# Patient Record
Sex: FEMALE | Race: WHITE
[De-identification: names, ages, dates, MRNs, and addresses within clinical notes are randomized per-mention and may not be internally consistent; named-entity substitution may affect disease eponyms.]

---

## 2017-05-22 ENCOUNTER — HOSPITAL ENCOUNTER (INPATIENT)
Dept: HOSPITAL 61 - ER | Age: 57
LOS: 2 days | Discharge: HOME | DRG: 552 | End: 2017-05-24
Attending: INTERNAL MEDICINE | Admitting: INTERNAL MEDICINE
Payer: MEDICARE

## 2017-05-22 VITALS — BODY MASS INDEX: 33.67 KG/M2 | WEIGHT: 227.31 LBS | HEIGHT: 69 IN

## 2017-05-22 DIAGNOSIS — Z88.8: ICD-10-CM

## 2017-05-22 DIAGNOSIS — M51.36: Primary | ICD-10-CM
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
## 2017-05-22 DIAGNOSIS — F32.9: ICD-10-CM

## 2017-05-22 DIAGNOSIS — Z90.710: ICD-10-CM

## 2017-05-22 DIAGNOSIS — Z87.11: ICD-10-CM

## 2017-05-22 DIAGNOSIS — F41.9: ICD-10-CM

## 2017-05-22 DIAGNOSIS — Z82.49: ICD-10-CM

## 2017-05-22 DIAGNOSIS — M54.16: ICD-10-CM

## 2017-05-22 DIAGNOSIS — G62.9: ICD-10-CM

## 2017-05-22 DIAGNOSIS — Z90.49: ICD-10-CM

## 2017-05-22 DIAGNOSIS — Z98.82: ICD-10-CM

## 2017-05-22 DIAGNOSIS — T39.395A: ICD-10-CM

## 2017-05-22 DIAGNOSIS — Z91.041: ICD-10-CM
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
## 2017-05-22 DIAGNOSIS — Z88.6: ICD-10-CM

## 2017-05-22 DIAGNOSIS — E03.9: ICD-10-CM

## 2017-05-22 DIAGNOSIS — M19.90: ICD-10-CM

## 2017-05-22 DIAGNOSIS — E66.9: ICD-10-CM
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
## 2017-05-22 LAB
BACTERIA #/AREA URNS HPF: 0 /HPF
BARBITURATES UR-MCNC: (no result) UG/ML
BENZODIAZ UR-MCNC: (no result) UG/L
BILIRUB UR QL STRIP: NEGATIVE
CANNABINOIDS UR-MCNC: (no result) UG/L
COCAINE UR-MCNC: (no result) NG/ML
GLUCOSE UR STRIP-MCNC: NEGATIVE MG/DL
METHADONE SERPL-MCNC: (no result) NG/ML
NITRITE UR QL STRIP: NEGATIVE
OPIATES UR-MCNC: (no result) NG/ML
PCP SERPL-MCNC: (no result) MG/DL
PH UR STRIP: 6.5 [PH]
PROT UR STRIP-MCNC: NEGATIVE MG/DL
RBC #/AREA URNS HPF: 0 /HPF (ref 0–2)
SP GR UR STRIP: 1.01
SQUAMOUS #/AREA URNS LPF: (no result) /LPF
UROBILINOGEN UR-MCNC: 0.2 MG/DL
WBC #/AREA URNS HPF: (no result) /HPF (ref 0–4)

## 2017-05-22 PROCEDURE — 71010: CPT

## 2017-05-22 PROCEDURE — 80048 BASIC METABOLIC PNL TOTAL CA: CPT

## 2017-05-22 PROCEDURE — 72148 MRI LUMBAR SPINE W/O DYE: CPT

## 2017-05-22 PROCEDURE — 96375 TX/PRO/DX INJ NEW DRUG ADDON: CPT

## 2017-05-22 PROCEDURE — 85027 COMPLETE CBC AUTOMATED: CPT

## 2017-05-22 PROCEDURE — 84439 ASSAY OF FREE THYROXINE: CPT

## 2017-05-22 PROCEDURE — 84484 ASSAY OF TROPONIN QUANT: CPT

## 2017-05-22 PROCEDURE — 84443 ASSAY THYROID STIM HORMONE: CPT

## 2017-05-22 PROCEDURE — 93005 ELECTROCARDIOGRAM TRACING: CPT

## 2017-05-22 PROCEDURE — 83735 ASSAY OF MAGNESIUM: CPT

## 2017-05-22 PROCEDURE — 36415 COLL VENOUS BLD VENIPUNCTURE: CPT

## 2017-05-22 PROCEDURE — 80061 LIPID PANEL: CPT

## 2017-05-22 PROCEDURE — 81001 URINALYSIS AUTO W/SCOPE: CPT

## 2017-05-22 PROCEDURE — 96374 THER/PROPH/DIAG INJ IV PUSH: CPT

## 2017-05-22 NOTE — PHYS DOC
Past Medical History


Past Medical History:  Depression, Other


Additional Past Medical Histor:  Lupus


Past Surgical History:  Appendectomy, Cholecystectomy, , Hysterectomy, 

Other


Additional Past Surgical Histo:  breast implants and removal, rt ankle


Alcohol Use:  Rarely


Drug Use:  None





Adult General


Chief Complaint


Chief Complaint:  CHEST PAIN





HPI


HPI





Patient is a 56  year old female who presents by EMS for multiple reasons.  She 

notes having low back pain and bilateral leg weakness over the past 3 days. She 

has required assistance with ambulation due to leg weakness. She does note 

intermittent shooting/tingling sensations through her pursed area buttock with 

bending and spontaneously. She denies saddle last he had, bowel or bladder 

dysfunction. She states this has made her anxious about her health. She does 

note that in the past week she has been dealing with increased "lupus pain" as 

well. Tonight around 9 PM, she started having chest pressure that radiated 

toward left shoulder associated with dyspnea, that is currently relieved. She 

did take an aspirin today prior to calling EMS. She has never seen a 

cardiologist or had a stress test.  She is not currently on steroids for lupus, 

but has taken the past. She has not seen a rheumatologist for lupus. She denies 

cough, dyspnea, lightheadedness, dizziness, headache, vision changes, abdominal 

pain.





Review of Systems


Review of Systems





Constitutional: Denies fever or chills []


Eyes: Denies change in visual acuity, redness, or eye pain []


HENT: Denies nasal congestion or sore throat []


Respiratory: Denies cough or shortness of breath []


Cardiovascular: No additional information not addressed in HPI []


GI: Denies abdominal pain, nausea, vomiting, bloody stools or diarrhea []


: Denies dysuria or hematuria []


Musculoskeletal: Denies joint pain []


Integument: Denies rash or skin lesions []


Neurologic: Denies headache []


Endocrine: Denies polyuria or polydipsia []





Current Medications


Current Medications





Current Medications








 Medications


  (Trade)  Dose


 Ordered  Sig/Sandy  Start Time


 Stop Time Status Last Admin


Dose Admin


 


 Dexamethasone


 Sodium Phosphate


  (Decadron)  10 mg  1X  ONCE  17 22:55


 17 22:56 DC  


 


 


 Ketorolac


 Tromethamine


  (Toradol)  10 mg  1X  ONCE  17 22:55


 17 22:56 DC  


 











Allergies


Allergies





Allergies








Coded Allergies Type Severity Reaction Last Updated Verified


 


  gabapentin Allergy Unknown  17 Yes


 


  morphine Allergy Unknown Nausea and Vomiting 17 Yes











Physical Exam


Physical Exam





Constitutional: Well developed, well nourished, no acute distress, non-toxic 

appearance. []


HENT: Normocephalic, atraumatic, bilateral external ears normal, oropharynx 

moist,nose normal. []


Eyes: PERRLA, EOMI. [] 


Neck: Normal range of motion, supple. [] 


Cardiovascular:Heart rate regular rhythm []


Lungs & Thorax:  Bilateral breath sounds clear to auscultation. No chest wall 

tenderness []


Abdomen: Bowel sounds normal, soft, no tenderness.  Poor rectal tone on digital 

rectal exam, but sensation intact to light touch. [] 


Skin: Warm, dry, no erythema, no rash. [] 


Back: No tenderness, no CVA tenderness. [] 


Extremities: No tenderness, ROM intact, no edema. [] 


Neurologic: Alert and oriented X 3, normal sensory function, no focal deficits 

noted. Strength 4/5 in bilateral lower extremities []


Psychologic: Affect normal, judgement normal, mood normal. []





Current Patient Data


Vital Signs





 Vital Signs








  Date Time  Temp Pulse Resp B/P (MAP) Pulse Ox O2 Delivery O2 Flow Rate FiO2


 


17 22:19 98.9 87 16 125/65 (85) 95 Room Air  





 98.9       











EKG


EKG


EKG as interpreted by me as normal sinus rhythm, rate 86, no ST-T changes, 

normal intervals, no ectopy





Radiology/Procedures


Radiology/Procedures


Chest xray as interpreted by me with no acute cardiopulmonary disease process





Course & Med Decision Making


Course & Med Decision Making


Pertinent Labs and Imaging studies reviewed. (See chart for details)





HEART score 2.  Recommend admission for ACS rule out due to onset of symptoms 

and no prior evaluation.  Also will be admitted for lower extremity weakness.  

MRI L-spine is ordered to rule out cauda equina syndrome.  Discussed case with 

Dr. Ravi, who will admit.  Cardiology consult placed.





Dragon Disclaimer


Dragon Disclaimer


This electronic medical record was generated, in whole or in part, using a 

voice recognition dictation system.





Departure


Departure


Impression:  


 Primary Impression:  


 Chest pain


 Additional Impressions:  


 Low back pain


 Leg weakness, bilateral


Disposition:   ADMITTED AS INPATIENT


Condition:  STABLE





Problem Qualifiers








 Primary Impression:  


 Chest pain


 Chest pain type:  unspecified  Qualified Codes:  R07.9 - Chest pain, 

unspecified


 Additional Impressions:  


 Low back pain


 Chronicity:  acute  Back pain laterality:  bilateral  Sciatica presence:  with 

sciatica  Sciatica laterality:  bilateral sciatica  Qualified Codes:  M54.42 - 

Lumbago with sciatica, left side; M54.41 - Lumbago with sciatica, right side








JOSH RENDON MD May 22, 2017 23:15

## 2017-05-23 VITALS — SYSTOLIC BLOOD PRESSURE: 109 MMHG | DIASTOLIC BLOOD PRESSURE: 58 MMHG

## 2017-05-23 VITALS — SYSTOLIC BLOOD PRESSURE: 118 MMHG | DIASTOLIC BLOOD PRESSURE: 72 MMHG

## 2017-05-23 VITALS — SYSTOLIC BLOOD PRESSURE: 124 MMHG | DIASTOLIC BLOOD PRESSURE: 73 MMHG

## 2017-05-23 VITALS — SYSTOLIC BLOOD PRESSURE: 119 MMHG | DIASTOLIC BLOOD PRESSURE: 69 MMHG

## 2017-05-23 VITALS — SYSTOLIC BLOOD PRESSURE: 113 MMHG | DIASTOLIC BLOOD PRESSURE: 50 MMHG

## 2017-05-23 VITALS — DIASTOLIC BLOOD PRESSURE: 58 MMHG | SYSTOLIC BLOOD PRESSURE: 106 MMHG

## 2017-05-23 VITALS — DIASTOLIC BLOOD PRESSURE: 61 MMHG | SYSTOLIC BLOOD PRESSURE: 118 MMHG

## 2017-05-23 LAB
ANION GAP SERPL CALC-SCNC: 7 MMOL/L (ref 6–14)
BASOPHILS # BLD AUTO: 0 X10^3/UL (ref 0–0.2)
BASOPHILS NFR BLD: 0 % (ref 0–3)
BUN SERPL-MCNC: 13 MG/DL (ref 7–20)
CALCIUM SERPL-MCNC: 9.2 MG/DL (ref 8.5–10.1)
CHLORIDE SERPL-SCNC: 104 MMOL/L (ref 98–107)
CHOLEST SERPL-MCNC: 190 MG/DL (ref 0–200)
CHOLEST/HDLC SERPL: 3.1 {RATIO}
CO2 SERPL-SCNC: 30 MMOL/L (ref 21–32)
CREAT SERPL-MCNC: 0.8 MG/DL (ref 0.6–1)
EOSINOPHIL NFR BLD: 2 % (ref 0–3)
ERYTHROCYTE [DISTWIDTH] IN BLOOD BY AUTOMATED COUNT: 15.2 % (ref 11.5–14.5)
GFR SERPLBLD BASED ON 1.73 SQ M-ARVRAT: 74.2 ML/MIN
GLUCOSE SERPL-MCNC: 79 MG/DL (ref 70–99)
HCT VFR BLD CALC: 36.6 % (ref 36–47)
HDLC SERPL-MCNC: 62 MG/DL (ref 40–60)
HGB BLD-MCNC: 12.2 G/DL (ref 12–15.5)
LYMPHOCYTES # BLD: 2.1 X10^3/UL (ref 1–4.8)
LYMPHOCYTES NFR BLD AUTO: 40 % (ref 24–48)
MAGNESIUM SERPL-MCNC: 2 MG/DL (ref 1.8–2.4)
MCH RBC QN AUTO: 30 PG (ref 25–35)
MCHC RBC AUTO-ENTMCNC: 33 G/DL (ref 31–37)
MCV RBC AUTO: 90 FL (ref 79–100)
MONOCYTES NFR BLD: 11 % (ref 0–9)
NEUTROPHILS NFR BLD AUTO: 47 % (ref 31–73)
NONHDLC SERPL-MCNC: 128 MG/DL (ref 0–129)
PLATELET # BLD AUTO: 203 X10^3/UL (ref 140–400)
POTASSIUM SERPL-SCNC: 3.9 MMOL/L (ref 3.5–5.1)
RBC # BLD AUTO: 4.05 X10^6/UL (ref 3.5–5.4)
SODIUM SERPL-SCNC: 141 MMOL/L (ref 136–145)
TRIGL SERPL-MCNC: 38 MG/DL (ref 0–150)
WBC # BLD AUTO: 5.2 X10^3/UL (ref 4–11)

## 2017-05-23 RX ADMIN — SULFAMETHOXAZOLE AND TRIMETHOPRIM SCH TAB: 800; 160 TABLET ORAL at 17:07

## 2017-05-23 RX ADMIN — VENLAFAXINE SCH MG: 50 TABLET ORAL at 13:27

## 2017-05-23 RX ADMIN — PANTOPRAZOLE SODIUM SCH MG: 40 TABLET, DELAYED RELEASE ORAL at 12:21

## 2017-05-23 RX ADMIN — ENOXAPARIN SODIUM SCH MG: 40 INJECTION SUBCUTANEOUS at 15:31

## 2017-05-23 RX ADMIN — VENLAFAXINE SCH MG: 50 TABLET ORAL at 21:00

## 2017-05-23 RX ADMIN — FENTANYL CITRATE PRN MCG: 50 INJECTION INTRAMUSCULAR; INTRAVENOUS at 00:16

## 2017-05-23 RX ADMIN — FENTANYL CITRATE PRN MCG: 50 INJECTION INTRAMUSCULAR; INTRAVENOUS at 04:59

## 2017-05-23 RX ADMIN — MORPHINE SULFATE PRN MG: 4 INJECTION, SOLUTION INTRAMUSCULAR; INTRAVENOUS at 13:27

## 2017-05-23 RX ADMIN — MORPHINE SULFATE PRN MG: 4 INJECTION, SOLUTION INTRAMUSCULAR; INTRAVENOUS at 17:54

## 2017-05-23 RX ADMIN — PREGABALIN SCH MG: 50 CAPSULE ORAL at 12:35

## 2017-05-23 RX ADMIN — MORPHINE SULFATE PRN MG: 4 INJECTION, SOLUTION INTRAMUSCULAR; INTRAVENOUS at 22:20

## 2017-05-23 RX ADMIN — LIDOCAINE SCH PATCH: 50 PATCH CUTANEOUS at 12:42

## 2017-05-23 RX ADMIN — OXYCODONE HYDROCHLORIDE AND ACETAMINOPHEN PRN TAB: 5; 325 TABLET ORAL at 20:00

## 2017-05-23 RX ADMIN — BUTORPHANOL TARTRATE PRN MG: 2 INJECTION, SOLUTION INTRAMUSCULAR; INTRAVENOUS at 08:23

## 2017-05-23 RX ADMIN — BUTORPHANOL TARTRATE PRN MG: 2 INJECTION, SOLUTION INTRAMUSCULAR; INTRAVENOUS at 02:07

## 2017-05-23 NOTE — RAD
PROCEDURE 

MRI lumbar spine without contrast 

 

HISTORY 

Lower back pain with bilateral leg weakness and posterior leg numbness 

paresthesias for 3 days 

 

TECHNIQUE 

Multiplanar MRI sequences of the lumbar spine were acquired without 

contrast. 

 

COMPARISON 

MRI lumbar spine December 23, 2012 

 

FINDINGS 

Lumbar vertebral body height and alignment intact. No bone marrow edema. 

Conus medullaris terminates at the L1-L2 disc level cauda equina is 

unremarkable. Paraspinal tissues are unremarkable. Disc disease is 

described below. 

L1-L2: Mild facet hypertrophy. No spinal canal or neural foraminal 

stenosis. 

L2-L3: Mild disc desiccation, left lateral mild broad-based disc 

protrusion. Very mild narrowing of the left neural foramen. Right neural 

foramen patent. No spinal canal stenosis. 

L3-L4: Disc desiccation. Shallow disc bulge mild facet osteophytes spinal 

canal and neural foramina are patent. 

L4-L5: Disc desiccation. Shallow disc bulge with superimposed left lateral

mild broad-based disc protrusion with lateral annulus tear. Mild facet 

osteophytes. Mild -moderate left neural foraminal stenosis is stable. 

Right neural foramen patent. Spinal canal patent. 

L5-S1: Disc desiccation, mild posterior disc height loss, shallow disc 

bulge with areas of annulus tear at the left paracentral and lateral 

annulus and mild lateral endplate osteophytes, there is increased disc 

height loss posteriorly relative to the prior exam. Mild facet 

osteophytes. Very mild neural foraminal narrowing is stable. Spinal canal 

patent. 

 

IMPRESSION 

Lumbar spine disc disease and facet osteoarthritis. No spinal canal 

stenosis. Mild -moderate left neural foraminal stenosis at L4-5. Very mild

narrowing of the neural foramina at L5-S1. See discussion above. 

 

Electronically signed by: Ambrosio Beverly MD (May 23, 2017 01:21:19)

## 2017-05-23 NOTE — PDOC2
CARDIAC CONSULT


DATE OF CONSULT


Date of Consult


DATE: 17 


TIME: 09:15





REASON FOR CONSULT


Reason for Consult:


Chest pain





REFERRING PHYSICIAN


Referring Physician:


White





SOURCE


Source:  Chart review, Patient





HISTORY OF PRESENT ILLNESS


HISTORY OF PRESENT ILLNESS


This is an anxious 57 yo female admitted for complains of lower back pain, leg 

weakness, recent fall, and chest pain.  Reports that she was walking her dog 5 

days ago and upon an incline close to bridge she fell, landed back first.  Her 

back has been bothering her more since then and 2 days ago she noticed more 

tingling to both legs and her weakness intensified yesterday and was not able 

to stand and just to move her legs she needed to assist it with her hands.  

This made her even more anxious and was feeling mid chest tightness with SOA 

which lasted about 10 minutes and has not recurred.  Currently she is 

complaining of low back pain and her weakness remains.  She typically takes 

tramadol and it works for her but this has not been helping much. Denies any CAD

, VTE.  No complains of associated radiating arm or jaw discomfort nor 

palpitations or nausea.





PAST MEDICAL HISTORY


Cardiovascular:  No pertinent hx


Pulmonary:  No pertinent hx


GI:  Peptic Ulcer disease


Heme/Onc:  Other (lupus in remission;  possible lyme disease noted bull eye 

lesion in the past prior to lupus symptoms)


Hepatobiliary:  No pertinent hx


Psych:  Anxiety


Musculoskeletal:  Osteoarthritis


Rheumatologic:  No pertinent hx


Infectious disease:  No pertinent hx


ENT:  No pertinent hx


Renal/:  No pertinent hx


Endocrine:  Hypothyroidism


Dermatology:  No pertinent hx





PAST SURGICAL HISTORY


Past Surgical History:  Appendectomy, Arthroscopy (right ankle), Cholecystectomy

, , Hysterectomy, Other (breast implant and removal)





FAMILY HISTORY


Family History


noncontributory to CV





SOCIAL HISTORY


Smoke:  No


ALCOHOL:  none


Drugs:  None


Lives:  Alone





CURRENT MEDICATIONS


CURRENT MEDICATIONS





Current Medications








 Medications


  (Trade)  Dose


 Ordered  Sig/Sandy


 Route


 PRN Reason  Start Time


 Stop Time Status Last Admin


Dose Admin


 


 Dexamethasone


 Sodium Phosphate


  (Decadron)  10 mg  1X  ONCE


 IV


   17 22:55


 17 22:56 DC 17 23:51


 


 


 Ondansetron HCl


  (Zofran)  4 mg  1X  ONCE


 IV


   17 23:55


 17 23:56 DC 17 23:51


 


 


 Lorazepam


  (Ativan)  1 mg  1X  ONCE


 IV


   17 23:55


 17 23:56 DC 17 23:50


 


 


 Fentanyl Citrate


  (Fentanyl 2ml


 Vial)  50 mcg  PRN 1X  PRN


 IV


 pain for one dose  17 00:00


    17 04:59


 


 


 Lorazepam


  (Ativan)  1 mg  PRN Q6HRS  PRN


 IV


 ANXIETY / AGITATION  17 01:45


    17 03:15


 


 


 Butorphanol


 Tartrate


  (Stadol)  1 mg  PRN Q6HRS  PRN


 IV


 PAIN  17 01:45


    17 08:23


 











ALLERGIES


ALLERGIES:  


Coded Allergies:  


     NSAIDS (Non-Steroidal Anti-Inflamma (Verified  Allergy, Unknown, Nausea 

and Vomiting, 17)


     gabapentin (Verified  Allergy, Unknown, 17)


     morphine (Verified  Allergy, Unknown, Nausea and Vomiting, 17)





ROS


Review of System


14 point ROS evaluated with pertinent positive noted per HPI





PHYSICAL EXAM


General:  Alert, Oriented X3, Cooperative, No acute distress


HEENT:  Atraumatic, Mucous membr. moist/pink


Lungs:  Clear to auscultation, Normal air movement


Heart:  Regular rate (SR), Normal S1, Normal S2, Other (2/6 systolic murmur to 

LLS border and FLAKITO border)


Abdomen:  Soft, No tenderness


Extremities:  No cyanosis, No edema


Skin:  No breakdown, No significant lesion


Neuro:  Normal speech, Sensation intact


Psych/Mental Status:  Mental status NL, Other (anxious)


MUSCULOSKELETAL:  Other (bilateral LE weakness)





VITALS


VITALS





Vital Signs








  Date Time  Temp Pulse Resp B/P (MAP) Pulse Ox O2 Delivery O2 Flow Rate FiO2


 


17 08:23     93 Room Air  


 


17 07:51 98.6 83 18 119/69 (86)    





 98.6       











LABS


Lab:





Laboratory Tests








Test


  17


23:36 17


06:10


 


Urine Collection Type U cath  


 


Urine Color Yellow  


 


Urine Clarity Clear  


 


Urine pH 6.5  


 


Urine Specific Gravity 1.015  


 


Urine Protein


  Negative mg/dL


(NEG-TRACE) 


 


 


Urine Glucose (UA)


  Negative mg/dL


(NEG) 


 


 


Urine Ketones (Stick)


  Negative mg/dL


(NEG) 


 


 


Urine Blood Negative (NEG)  


 


Urine Nitrite Negative (NEG)  


 


Urine Bilirubin Negative (NEG)  


 


Urine Urobilinogen Dipstick


  0.2 mg/dL (0.2


mg/dL) 


 


 


Urine Leukocyte Esterase Negative (NEG)  


 


Urine RBC 0 /HPF (0-2)  


 


Urine WBC Occ /HPF (0-4)  


 


Urine Squamous Epithelial


Cells Few /LPF 


  


 


 


Urine Amorphous Sediment Present /HPF  


 


Urine Bacteria 0 /HPF (0-FEW)  


 


Urine Mucus Mod /LPF  


 


Urine Opiates Screen Neg (NEG)  


 


Urine Methadone Screen Neg (NEG)  


 


Urine Barbiturates Neg (NEG)  


 


Urine Phencyclidine Screen Neg (NEG)  


 


Urine


Amphetamine/Methamphetamine Neg (NEG) 


  


 


 


Urine Benzodiazepines Screen Neg (NEG)  


 


Urine Cocaine Screen Neg (NEG)  


 


Urine Cannabinoids Screen Neg (NEG)  


 


Urine Ethyl Alcohol Neg (NEG)  


 


Troponin I Quantitative


  


  < 0.017 ng/mL


(0.000-0.055)











ASSESSMENT/PLAN


ASSESSMENT/PLAN


1. Atypical chest pain: troponin series normal, EKG SR without acute changes. 

Doubt ACS, likely anxiety


2. Mechanical fall: happened 5 days ago, landed on her back


3. Low back pain with Lumbar radiculopathy: 2 day worsening low back pain with 

bilateral LE weakness and neuropathy. Likely exacerbated by recent fall. 


4. Hypothyroidism


5. Anxiety: started on SSRI 3/2017 since brothers passing.  Defer to PCP


6. Hx of PUD: NSAID induced. 4 months ago with gastric ulcer treated with PPI 

and cautery per pt and re-scoped 3 months ago and was better per her report. 





Recommendations


1. Baseline TTE, no further cardiac w/u if unremarkable


2. TSH, lipid panel.  


3. Resume PPI


4. Consult Dr. Bowman if OK with PCP.


Problems:  











SWATI SORIA May 23, 2017 09:21

## 2017-05-23 NOTE — EKG
[facility]

               [address] [place] [address]

Test Date:    2017               Test Time:    22:17:06

Pat Name:     GEORGE THURSTON          Department:   

Patient ID:   [id number]           Room:         [facility]

Gender:       F                        Technician:   

:          1960               Requested By: JOSH RENDON

Order Number: [id number]            Reading MD:   Masha Shine

                                 Measurements

Intervals                              Axis          

Rate:         86                       P:            52

RI:           162                      QRS:          28

QRSD:         88                       T:            47

QT:           366                                    

QTc:          441                                    

                           Interpretive Statements

SINUS RHYTHM

NORMAL EKG



Electronically Signed On 2017 21:44:53 CDT by Masha Shine

## 2017-05-23 NOTE — RAD
Portable chest, 5/22/2017:



History: Chest pain



Comparison is made to a study from 3/8/2013. The heart size and pulmonary

vascularity are normal. No pulmonary infiltrate is seen. There is no evidence

of pleural fluid. Moderate spurring is present in the spine.



IMPRESSION: No acute cardiopulmonary abnormality is detected.

## 2017-05-23 NOTE — PDOC1
History and Physical


Date of Admission


Date of Admission


17





Identification/Chief Complaint


Chief Complaint


LOwer back pain


chest pain


Problems:  





Source


Source:  Chart review, Patient





History of Present Illness


History of Present Illness





HPI


HPI





Patient is a 56  year old female who presents by EMS for multiple reasons. 


Pt changed her first name since last year, as per her father, she is a pain 

seeker, worsening psych issues, and usually brayan to [facility].


Pt said she fell 5days ago with her little dog, and has severe lower back pain 

for 2 days, severe, which cause her hard to move bl lower ext,  intermittent 

shooting/tingling sensations through her pursed area buttock with bending and 

spontaneously. no Bowel or urination problem.


She also has some chest pressure pain at 9pm last night, radiating to left 

shoulder, with some sob, moderate, now pain free. 


 She denies cough, dyspnea, lightheadedness, dizziness, headache, vision changes

, abdominal pain.





Pt appears very uncomfortable to me now, no tenderness at back. She require iv 

pain meds, refused po pain meds, saying morphine made her sick years ago, but 

required dilaudid to the nurse at night.





Past Medical History


Cardiovascular:  No pertinent hx


Pulmonary:  No pertinent hx


GI:  Peptic Ulcer disease


Heme/Onc:  Other (lupus in remission;  possible lyme disease noted bull eye 

lesion in the past prior to lupus symptoms)


Hepatobiliary:  No pertinent hx


Psych:  Anxiety


Rheumatologic:  No pertinent hx


Infectious disease:  No pertinent hx


ENT:  No pertinent hx


Renal/:  No pertinent hx


Endocrine:  Hypothyroidism


Dermatology:  No pertinent hx





Past Surgical History


Past Surgical History:  Appendectomy, Arthroscopy (right ankle), Cholecystectomy

, , Hysterectomy, Other (breast implant and removal)





Family History


Family History:  Hypertension





Social History


Smoke:  No


ALCOHOL:  none


Drugs:  None





Current Problem List


Problem List


Problems


Medical Problems:


(1) Chest pain


Status: Acute  





(2) Leg weakness, bilateral


Status: Acute  





(3) Low back pain


Status: Acute  











Current Medications


Current Medications





Current Medications








 Medications


  (Trade)  Dose


 Ordered  Sig/Sandy  Start Time


 Stop Time Status Last Admin


Dose Admin


 


 Acetaminophen


  (Tylenol)  650 mg  PRN Q6HRS  PRN  17 12:00


     


 


 


 Alprazolam


  (Xanax)  1 mg  PRN Q6HRS  PRN  17 12:00


    17 12:42


1 MG


 


 Butorphanol


 Tartrate


  (Stadol)  1 mg  PRN Q6HRS  PRN  17 01:45


    17 08:23


1 MG


 


 Dexamethasone


 Sodium Phosphate


  (Decadron)  10 mg  1X  ONCE  17 22:55


 17 22:56 DC 17 23:51


10 MG


 


 Fentanyl Citrate


  (Fentanyl 2ml


 Vial)  50 mcg  PRN Q2HR  PRN  17 01:45


 17 13:22 DC  


 


 


 Ketorolac


 Tromethamine


  (Toradol)  10 mg  1X  ONCE  17 22:55


 17 22:56 DC  


 


 


 Levothyroxine


 Sodium


  (Synthroid)  125 mcg  DAILYAC  17 07:30


     


 


 


 Lidocaine


  (Lidoderm)  1 patch  DAILY  17 12:15


    17 12:42


1 PATCH


 


 Lidocaine/


 Prilocaine


  (Emla)  1 meera  DAILY  17 11:50


   Cancel  


 


 


 Lorazepam


  (Ativan)  1 mg  PRN Q6HRS  PRN  17 01:45


    17 09:40


1 MG


 


 Morphine Sulfate  4 mg  PRN Q4HRS  PRN  17 13:15


    17 13:27


4 MG


 


 Nitroglycerin


  (Nitrostat)  0.4 mg  PRN Q5MIN  PRN  17 00:00


 17 23:59   


 


 


 Non-Formulary


 Medication  300 mg  DAILY  17 09:00


 17 09:00 DC  


 


 


 Ondansetron HCl


  (Zofran)  4 mg  PRN Q6HRS  PRN  17 12:00


    17 13:28


4 MG


 


 Oxycodone/


 Acetaminophen


  (Percocet 5/325)  1 tab  PRN Q4HRS  PRN  17 11:45


     


 


 


 Pantoprazole


 Sodium


  (Protonix)  40 mg  DAILYAC  17 11:00


    17 12:21


40 MG


 


 Pregabalin


  (Lyrica)  300 mg  DAILY  17 12:30


    17 12:35


300 MG


 


 Tramadol HCl


  (Ultram)  50 mg  BID  17 12:30


     


 


 


 Trazodone HCl


  (Desyrel)  450 mg  PRN QHS  PRN  17 21:00


     


 


 


 Venlafaxine HCl


  (Effexor)  100 mg  TID  17 14:00


    17 13:27


100 MG


 


 Zolpidem Tartrate


  (Ambien)  5 mg  PRN QHS  PRN  17 12:30


     


 











Allergies


Allergies





Allergies








Coded Allergies Type Severity Reaction Last Updated Verified


 


  NSAIDS (Non-Steroidal Anti-Inflamma Allergy Unknown Nausea and Vomiting  Yes


 


  gabapentin Allergy Unknown  17 Yes











ROS


Review of System


CONSTITUTIONAL:        No fever or chills


EYES:                          No recent changes


SKIN:               No rash or itching


CARDIOVASCULAR:     No chest pain, syncope, palpitations, or edema


RESPIRATORY:            No SOB or cough


GASTROINTESTINAL:    No nausea, vomiting or abdominal pain


NEUROLOGICAL:          No headaches or weakness


ENDOCRINE:               No cold or heat intolerance


GENITOURINARY:         No urgency or frequency of urination


MUSCULOSKELETAL:   No back pain or joint pain


LYMPHATICS:               No enlarged lymph nodes


PSYCHIATRIC:              No anxiety or depression





Physical Exam


Physical Exam


GEN.:    appears very anxious in pain. Alert and oriented.No tenderness at 

back. 


HEENT:    Head is normocephalic, atraumatic


NECK:    Supple.  


LUNGS:    Clear to auscultation.


HEART:    RRR, S1, S2 present.  Peripheral pulses intact


ABDOMEN:    Soft, nontender.  Positive bowel sounds.


EXTREMITIES:    Without any cyanosis.    barely to move lower ext 2/2 back pain


NEUROLOGIC:     Normal speech, normal tone


PSYCHIATRIC:    Normal affect, normal mood.


SKIN:   No ulcerations





Vitals


Vitals





Vital Signs








  Date Time  Temp Pulse Resp B/P (MAP) Pulse Ox O2 Delivery O2 Flow Rate FiO2


 


17 13:27     93 Room Air  


 


17 11:06 97.8 85 18 118/61 (80)    





 97.8       











Labs


Labs





Laboratory Tests








Test


  17


00:13 17


23:36 17


06:10 17


11:50


 


White Blood Count


  5.2 x10^3/uL


(4.0-11.0) 


  


  


 


 


Red Blood Count


  4.05 x10^6/uL


(3.50-5.40) 


  


  


 


 


Hemoglobin


  12.2 g/dL


(12.0-15.5) 


  


  


 


 


Hematocrit


  36.6 %


(36.0-47.0) 


  


  


 


 


Mean Corpuscular Volume 90 fL ()    


 


Mean Corpuscular Hemoglobin 30 pg (25-35)    


 


Mean Corpuscular Hemoglobin


Concent 33 g/dL


(31-37) 


  


  


 


 


Red Cell Distribution Width


  15.2 %


(11.5-14.5) 


  


  


 


 


Platelet Count


  203 x10^3/uL


(140-400) 


  


  


 


 


Neutrophils (%) (Auto) 47 % (31-73)    


 


Lymphocytes (%) (Auto) 40 % (24-48)    


 


Monocytes (%) (Auto) 11 % (0-9)    


 


Eosinophils (%) (Auto) 2 % (0-3)    


 


Basophils (%) (Auto) 0 % (0-3)    


 


Neutrophils # (Auto)


  2.5 x10^3uL


(1.8-7.7) 


  


  


 


 


Lymphocytes # (Auto)


  2.1 x10^3/uL


(1.0-4.8) 


  


  


 


 


Monocytes # (Auto)


  0.6 x10^3/uL


(0.0-1.1) 


  


  


 


 


Eosinophils # (Auto)


  0.1 x10^3/uL


(0.0-0.7) 


  


  


 


 


Basophils # (Auto)


  0.0 x10^3/uL


(0.0-0.2) 


  


  


 


 


Sodium Level


  141 mmol/L


(136-145) 


  


  


 


 


Potassium Level


  3.9 mmol/L


(3.5-5.1) 


  


  


 


 


Chloride Level


  104 mmol/L


() 


  


  


 


 


Carbon Dioxide Level


  30 mmol/L


(21-32) 


  


  


 


 


Anion Gap 7 (6-14)    


 


Blood Urea Nitrogen


  13 mg/dL


(7-20) 


  


  


 


 


Creatinine


  0.8 mg/dL


(0.6-1.0) 


  


  


 


 


Estimated GFR


(Cockcroft-Gault) 74.2 


  


  


  


 


 


Glucose Level


  79 mg/dL


(70-99) 


  


  


 


 


Calcium Level


  9.2 mg/dL


(8.5-10.1) 


  


  


 


 


Magnesium Level


  2.0 mg/dL


(1.8-2.4) 


  


  


 


 


Troponin I Quantitative


  < 0.017 ng/mL


(0.000-0.055) 


  < 0.017 ng/mL


(0.000-0.055) < 0.017 ng/mL


(0.000-0.055)


 


Urine Collection Type  U cath   


 


Urine Color  Yellow   


 


Urine Clarity  Clear   


 


Urine pH  6.5   


 


Urine Specific Gravity  1.015   


 


Urine Protein


  


  Negative mg/dL


(NEG-TRACE) 


  


 


 


Urine Glucose (UA)


  


  Negative mg/dL


(NEG) 


  


 


 


Urine Ketones (Stick)


  


  Negative mg/dL


(NEG) 


  


 


 


Urine Blood  Negative (NEG)   


 


Urine Nitrite  Negative (NEG)   


 


Urine Bilirubin  Negative (NEG)   


 


Urine Urobilinogen Dipstick


  


  0.2 mg/dL (0.2


mg/dL) 


  


 


 


Urine Leukocyte Esterase  Negative (NEG)   


 


Urine RBC  0 /HPF (0-2)   


 


Urine WBC  Occ /HPF (0-4)   


 


Urine Squamous Epithelial


Cells 


  Few /LPF 


  


  


 


 


Urine Amorphous Sediment  Present /HPF   


 


Urine Bacteria  0 /HPF (0-FEW)   


 


Urine Mucus  Mod /LPF   


 


Urine Opiates Screen  Neg (NEG)   


 


Urine Methadone Screen  Neg (NEG)   


 


Urine Barbiturates  Neg (NEG)   


 


Urine Phencyclidine Screen  Neg (NEG)   


 


Urine


Amphetamine/Methamphetamine 


  Neg (NEG) 


  


  


 


 


Urine Benzodiazepines Screen  Neg (NEG)   


 


Urine Cocaine Screen  Neg (NEG)   


 


Urine Cannabinoids Screen  Neg (NEG)   


 


Urine Ethyl Alcohol  Neg (NEG)   


 


Triglycerides Level


  


  


  38 mg/dL


(0-150) 


 


 


Cholesterol Level


  


  


  190 mg/dL


(0-200) 


 


 


LDL Cholesterol, Calculated


  


  


  120 mg/dL


(0-100) 


 


 


VLDL Cholesterol, Calculated   8 mg/dL (0-40)  


 


Non-HDL Cholesterol Calculated


  


  


  128 mg/dL


(0-129) 


 


 


HDL Cholesterol


  


  


  62 mg/dL


(40-60) 


 


 


Cholesterol/HDL Ratio   3.1  


 


Thyroid Stimulating Hormone


(TSH) 


  


  0.104 uIU/mL


(0.358-3.74) 


 








Laboratory Tests








Test


  17


23:36 17


06:10 17


11:50


 


Urine Collection Type U cath   


 


Urine Color Yellow   


 


Urine Clarity Clear   


 


Urine pH 6.5   


 


Urine Specific Gravity 1.015   


 


Urine Protein


  Negative mg/dL


(NEG-TRACE) 


  


 


 


Urine Glucose (UA)


  Negative mg/dL


(NEG) 


  


 


 


Urine Ketones (Stick)


  Negative mg/dL


(NEG) 


  


 


 


Urine Blood Negative (NEG)   


 


Urine Nitrite Negative (NEG)   


 


Urine Bilirubin Negative (NEG)   


 


Urine Urobilinogen Dipstick


  0.2 mg/dL (0.2


mg/dL) 


  


 


 


Urine Leukocyte Esterase Negative (NEG)   


 


Urine RBC 0 /HPF (0-2)   


 


Urine WBC Occ /HPF (0-4)   


 


Urine Squamous Epithelial


Cells Few /LPF 


  


  


 


 


Urine Amorphous Sediment Present /HPF   


 


Urine Bacteria 0 /HPF (0-FEW)   


 


Urine Mucus Mod /LPF   


 


Urine Opiates Screen Neg (NEG)   


 


Urine Methadone Screen Neg (NEG)   


 


Urine Barbiturates Neg (NEG)   


 


Urine Phencyclidine Screen Neg (NEG)   


 


Urine


Amphetamine/Methamphetamine Neg (NEG) 


  


  


 


 


Urine Benzodiazepines Screen Neg (NEG)   


 


Urine Cocaine Screen Neg (NEG)   


 


Urine Cannabinoids Screen Neg (NEG)   


 


Urine Ethyl Alcohol Neg (NEG)   


 


Troponin I Quantitative


  


  < 0.017 ng/mL


(0.000-0.055) < 0.017 ng/mL


(0.000-0.055)


 


Triglycerides Level


  


  38 mg/dL


(0-150) 


 


 


Cholesterol Level


  


  190 mg/dL


(0-200) 


 


 


LDL Cholesterol, Calculated


  


  120 mg/dL


(0-100) 


 


 


VLDL Cholesterol, Calculated  8 mg/dL (0-40)  


 


Non-HDL Cholesterol Calculated


  


  128 mg/dL


(0-129) 


 


 


HDL Cholesterol


  


  62 mg/dL


(40-60) 


 


 


Cholesterol/HDL Ratio  3.1  


 


Thyroid Stimulating Hormone


(TSH) 


  0.104 uIU/mL


(0.358-3.74) 


 











VTE Prophylaxis Ordered


VTE Prophylaxis Devices:  Yes


VTE Pharmacological Prophylaxi:  Yes





Assessment/Plan


Assessment/Plan





1. lower back pain, 2/2 OA, or muscle spasm


2. chest pain, atypical, 2/2 anxiety likely


3 bipoloar disorder, 1


4. h/o lupus, no meds


5. likely pain meds seeker


6. obesity


7. hypothyroidism





plan:


card consulted, echo pending, CE , EKG neg


MRI back not remarkable


dr. Bowman consult


pt said morphine made her sick years ago, but tolerate morphine iv and dilaudid 


add percocet, but pt refuses


add lidoderm patch, flexiril


dvt ppx


ptot























IRIS JEFFREY MD May 23, 2017 13:47

## 2017-05-24 VITALS — SYSTOLIC BLOOD PRESSURE: 121 MMHG | DIASTOLIC BLOOD PRESSURE: 67 MMHG

## 2017-05-24 VITALS — SYSTOLIC BLOOD PRESSURE: 102 MMHG | DIASTOLIC BLOOD PRESSURE: 59 MMHG

## 2017-05-24 VITALS — SYSTOLIC BLOOD PRESSURE: 107 MMHG | DIASTOLIC BLOOD PRESSURE: 50 MMHG

## 2017-05-24 LAB
ANION GAP SERPL CALC-SCNC: 7 MMOL/L (ref 6–14)
BASOPHILS # BLD AUTO: 0 X10^3/UL (ref 0–0.2)
BASOPHILS NFR BLD: 0 % (ref 0–3)
BUN SERPL-MCNC: 20 MG/DL (ref 7–20)
CALCIUM SERPL-MCNC: 8.6 MG/DL (ref 8.5–10.1)
CHLORIDE SERPL-SCNC: 106 MMOL/L (ref 98–107)
CO2 SERPL-SCNC: 31 MMOL/L (ref 21–32)
CREAT SERPL-MCNC: 0.9 MG/DL (ref 0.6–1)
EOSINOPHIL NFR BLD: 1 % (ref 0–3)
ERYTHROCYTE [DISTWIDTH] IN BLOOD BY AUTOMATED COUNT: 15.4 % (ref 11.5–14.5)
GFR SERPLBLD BASED ON 1.73 SQ M-ARVRAT: 64.8 ML/MIN
GLUCOSE SERPL-MCNC: 110 MG/DL (ref 70–99)
HCT VFR BLD CALC: 36.1 % (ref 36–47)
HGB BLD-MCNC: 12.3 G/DL (ref 12–15.5)
LYMPHOCYTES # BLD: 2.3 X10^3/UL (ref 1–4.8)
LYMPHOCYTES NFR BLD AUTO: 43 % (ref 24–48)
MCH RBC QN AUTO: 31 PG (ref 25–35)
MCHC RBC AUTO-ENTMCNC: 34 G/DL (ref 31–37)
MCV RBC AUTO: 90 FL (ref 79–100)
MONOCYTES NFR BLD: 9 % (ref 0–9)
NEUTROPHILS NFR BLD AUTO: 47 % (ref 31–73)
PLATELET # BLD AUTO: 211 X10^3/UL (ref 140–400)
POTASSIUM SERPL-SCNC: 4 MMOL/L (ref 3.5–5.1)
RBC # BLD AUTO: 4.02 X10^6/UL (ref 3.5–5.4)
SODIUM SERPL-SCNC: 144 MMOL/L (ref 136–145)
WBC # BLD AUTO: 5.3 X10^3/UL (ref 4–11)

## 2017-05-24 RX ADMIN — MORPHINE SULFATE PRN MG: 4 INJECTION, SOLUTION INTRAMUSCULAR; INTRAVENOUS at 03:17

## 2017-05-24 RX ADMIN — SULFAMETHOXAZOLE AND TRIMETHOPRIM SCH TAB: 800; 160 TABLET ORAL at 08:35

## 2017-05-24 RX ADMIN — MORPHINE SULFATE PRN MG: 4 INJECTION, SOLUTION INTRAMUSCULAR; INTRAVENOUS at 12:47

## 2017-05-24 RX ADMIN — MORPHINE SULFATE PRN MG: 4 INJECTION, SOLUTION INTRAMUSCULAR; INTRAVENOUS at 08:35

## 2017-05-24 RX ADMIN — VENLAFAXINE SCH MG: 50 TABLET ORAL at 08:34

## 2017-05-24 RX ADMIN — ENOXAPARIN SODIUM SCH MG: 40 INJECTION SUBCUTANEOUS at 15:39

## 2017-05-24 RX ADMIN — OXYCODONE HYDROCHLORIDE AND ACETAMINOPHEN PRN TAB: 5; 325 TABLET ORAL at 08:34

## 2017-05-24 RX ADMIN — PANTOPRAZOLE SODIUM SCH MG: 40 TABLET, DELAYED RELEASE ORAL at 07:45

## 2017-05-24 RX ADMIN — OXYCODONE HYDROCHLORIDE AND ACETAMINOPHEN PRN TAB: 5; 325 TABLET ORAL at 12:47

## 2017-05-24 RX ADMIN — VENLAFAXINE SCH MG: 50 TABLET ORAL at 14:12

## 2017-05-24 RX ADMIN — PREGABALIN SCH MG: 50 CAPSULE ORAL at 08:35

## 2017-05-24 RX ADMIN — LIDOCAINE SCH PATCH: 50 PATCH CUTANEOUS at 08:36

## 2017-05-24 NOTE — CONS
DATE OF CONSULTATION:  05/23/2017



ATTENDING PHYSICIAN:  Dr. Pabon.



The patient was seen at the request of Dr. Pabon for rehab evaluation.



HISTORY OF PRESENT ILLNESS:  This is a 56-year-old female with history of lupus,

on remission.   ____ is to be her family physician.  The patient was also

diagnosed as having peripheral neuropathy, has been taking Lyrica.  The patient

fell about 5 days ago, landed on her back while up walking with her dog, she

slipped.  Since then she is having severe back pain with radiation to both lower

extremities and admits significant stiffness in her lower extremities with

associated numbness and tingling sensation.  She denies any trouble with her

bowel or bladder control.  She was admitted on 05/22/2017, and asking for IV

narcotic pain medication.  THE PATIENT IS KNOWN ALLERGIC TO NONSTEROIDAL

ANTI-INFLAMMATORY DRUGS AND GABAPENTIN.



PAST MEDICAL HISTORY:  Also includes peptic ulcer disease, anxiety,

hypothyroidism.  The patient is status post appendectomy, arthroscopic surgery

right ankle, cholecystectomy, hysterectomy, breast implant and removal.



FAMILY HISTORY:  Hypertension.



PHYSICAL EXAMINATION:  Today revealed a middle-aged female.  She is alert,

oriented to time, place, person and circumstance and follows commands

appropriately.  Moves all 4 extremities voluntarily where she had 4+/5 grade

muscle strength and deep tendon reflexes are 1 to 2+ and symmetrical and she had

equal perception of touch and pinprick sensation bilaterally.  She had minimal

tenderness to palpation over thoracic and lumbar paraspinal muscles.  Straight

leg raising test is negative bilaterally.  She had pain free range of motion on

both hip and knee and ankle joints.  She is independent with bed mobility and

transfers.  Once up, she walks with somewhat antalgic gait.  I did not see any

loss of balance.



ASSESSMENT:  A middle-aged female with recent thoracolumbar sprain from a fall

with radiological evidence of degenerative disk disease and degenerative joint

disease of lumbar vertebrae with lumbar radiculitis and also history of

peripheral neuropathy probably sensory as she admits burning sensation in her

feet if she does not take Lyrica on a regular basis.



RECOMMENDATIONS:  I have reviewed with her a home program of physical

modalities, trigger point massage and relax stretching exercises and proper body

mechanics to be done on a regular basis.  Home when medically stable with

outpatient followup.



Dr. Pabon, I, appreciate asking me to participate in the care of this interesting

patient.  I will be glad to follow her with you as needed for her

rehabilitation.

 



______________________________

CARSON MADRID MD



DR:  ANA/stevo  JOB#:  [id number] / [id number]

DD:  05/23/2017 17:45  DT:  05/24/2017 06:05

## 2017-05-24 NOTE — PDOC
PROGRESS NOTES


Subjective


Subjective


She admits continued back pain.





Objective


Objective





Vital Signs








  Date Time  Temp Pulse Resp B/P (MAP) Pulse Ox O2 Delivery O2 Flow Rate FiO2


 


5/24/17 08:35      Room Air  


 


5/24/17 07:22 97.5 81 18 121/67 (85) 92   





 97.5       














Intake and Output 


 


 5/24/17





 07:00


 


Intake Total 720 ml


 


Balance 720 ml


 


 


 


Intake Oral 720 ml


 


# Voids 6











Physical Exam


Physical Exam


She is alert,supine in bed but remains independent with her mobility.





Assessment


Assessment


Problems


Medical Problems:


(1) Chest pain


Status: Acute  





(2) Leg weakness, bilateral


Status: Acute  





(3) Low back pain


Status: Acute  











Plan


Plan of Care


Home with home health or out patient follow up when medically stable.





Comment


Review of Relevant


I have reviewed the following items wyatt (where applicable) has been applied.


Labs





Laboratory Tests








Test


  5/22/17


23:36 5/23/17


06:10 5/23/17


11:50


 


Urine Collection Type U cath   


 


Urine Color Yellow   


 


Urine Clarity Clear   


 


Urine pH 6.5   


 


Urine Specific Gravity 1.015   


 


Urine Protein


  Negative mg/dL


(NEG-TRACE) 


  


 


 


Urine Glucose (UA)


  Negative mg/dL


(NEG) 


  


 


 


Urine Ketones (Stick)


  Negative mg/dL


(NEG) 


  


 


 


Urine Blood Negative (NEG)   


 


Urine Nitrite Negative (NEG)   


 


Urine Bilirubin Negative (NEG)   


 


Urine Urobilinogen Dipstick


  0.2 mg/dL (0.2


mg/dL) 


  


 


 


Urine Leukocyte Esterase Negative (NEG)   


 


Urine RBC 0 /HPF (0-2)   


 


Urine WBC Occ /HPF (0-4)   


 


Urine Squamous Epithelial


Cells Few /LPF 


  


  


 


 


Urine Amorphous Sediment Present /HPF   


 


Urine Bacteria 0 /HPF (0-FEW)   


 


Urine Mucus Mod /LPF   


 


Urine Opiates Screen Neg (NEG)   


 


Urine Methadone Screen Neg (NEG)   


 


Urine Barbiturates Neg (NEG)   


 


Urine Phencyclidine Screen Neg (NEG)   


 


Urine


Amphetamine/Methamphetamine Neg (NEG) 


  


  


 


 


Urine Benzodiazepines Screen Neg (NEG)   


 


Urine Cocaine Screen Neg (NEG)   


 


Urine Cannabinoids Screen Neg (NEG)   


 


Urine Ethyl Alcohol Neg (NEG)   


 


Troponin I Quantitative


  


  < 0.017 ng/mL


(0.000-0.055) < 0.017 ng/mL


(0.000-0.055)


 


Triglycerides Level


  


  38 mg/dL


(0-150) 


 


 


Cholesterol Level


  


  190 mg/dL


(0-200) 


 


 


LDL Cholesterol, Calculated


  


  120 mg/dL


(0-100) 


 


 


VLDL Cholesterol, Calculated  8 mg/dL (0-40)  


 


Non-HDL Cholesterol Calculated


  


  128 mg/dL


(0-129) 


 


 


HDL Cholesterol


  


  62 mg/dL


(40-60) 


 


 


Cholesterol/HDL Ratio  3.1  


 


Thyroid Stimulating Hormone


(TSH) 


  0.104 uIU/mL


(0.358-3.74) 


 








Laboratory Tests








Test


  5/23/17


11:50


 


Troponin I Quantitative


  < 0.017 ng/mL


(0.000-0.055)








Medications





Current Medications


Dexamethasone Sodium Phosphate (Decadron) 10 mg 1X  ONCE IV  Last administered 

on 5/22/17at 23:51;  Start 5/22/17 at 22:55;  Stop 5/22/17 at 22:56;  Status DC


Ketorolac Tromethamine (Toradol) 10 mg 1X  ONCE IV ;  Start 5/22/17 at 22:55;  

Stop 5/22/17 at 22:56;  Status DC


Ondansetron HCl (Zofran) 4 mg 1X  ONCE IV  Last administered on 5/22/17at 23:51

;  Start 5/22/17 at 23:55;  Stop 5/22/17 at 23:56;  Status DC


Lorazepam (Ativan) 1 mg 1X  ONCE IV  Last administered on 5/22/17at 23:50;  

Start 5/22/17 at 23:55;  Stop 5/22/17 at 23:56;  Status DC


Lorazepam (Ativan) 2 mg STK-MED ONCE .ROUTE ;  Start 5/22/17 at 23:47;  Stop 5/ 22/17 at 23:48;  Status DC


Ondansetron HCl (Zofran) 4 mg PRN Q8HRS  PRN IV NAUSEA/VOMITING;  Start 5/23/17 

at 00:00;  Stop 5/23/17 at 13:18;  Status DC


Acetaminophen (Tylenol) 650 mg PRN Q4HRS  PRN PO PAIN;  Start 5/23/17 at 00:00;

  Stop 5/23/17 at 13:17;  Status DC


Nitroglycerin (Nitrostat) 0.4 mg PRN Q5MIN  PRN SL CHEST PAIN;  Start 5/23/17 

at 00:00;  Stop 5/23/17 at 23:59;  Status DC


Fentanyl Citrate (Fentanyl 2ml Vial) 50 mcg PRN 1X  PRN IV pain for one dose 

Last administered on 5/23/17at 04:59;  Start 5/23/17 at 00:00;  Stop 5/23/17 at 

11:47;  Status DC


Lorazepam (Ativan) 1 mg PRN Q6HRS  PRN IV ANXIETY / AGITATION Last administered 

on 5/23/17at 09:40;  Start 5/23/17 at 01:45


Butorphanol Tartrate (Stadol) 1 mg PRN Q6HRS  PRN IV MODERATE PAIN Last 

administered on 5/23/17at 08:23;  Start 5/23/17 at 01:45


Fentanyl Citrate (Fentanyl 2ml Vial) 50 mcg PRN Q2HR  PRN IV MILD TO MODERATE 

PAIN;  Start 5/23/17 at 01:45;  Stop 5/23/17 at 13:22;  Status DC


Pantoprazole Sodium (Protonix) 40 mg DAILYAC PO  Last administered on 5/24/17at 

07:45;  Start 5/23/17 at 11:00


Oxycodone/ Acetaminophen (Percocet 5/325) 1 tab PRN Q4HRS  PRN PO MODERATE TO 

SEVERE PAIN Last administered on 5/23/17at 15:27;  Start 5/23/17 at 11:45;  

Stop 5/23/17 at 19:52;  Status DC


Lidocaine/ Prilocaine (Emla) 1 meera DAILY TP ;  Start 5/23/17 at 11:50;  Status 

Cancel


Alprazolam (Xanax) 1 mg PRN Q6HRS  PRN PO ANXIETY / AGITATION Last administered 

on 5/24/17at 03:37;  Start 5/23/17 at 12:00


Levothyroxine Sodium (Synthroid) 125 mcg DAILYAC PO  Last administered on 5/24/ 17at 07:45;  Start 5/24/17 at 07:30


Tramadol HCl (Ultram) 50 mg BID PO  Last administered on 5/23/17at 23:51;  

Start 5/23/17 at 12:30


Non-Formulary Medication 300 mg DAILY PO ;  Start 5/24/17 at 09:00;  Stop 5/24/ 17 at 09:00;  Status DC


Trazodone HCl (Desyrel) 450 mg PRN QHS  PRN PO INSOMNIA Last administered on 5/ 23/17at 23:57;  Start 5/23/17 at 21:00


Venlafaxine HCl (Effexor) 100 mg TID PO  Last administered on 5/24/17at 08:34;  

Start 5/23/17 at 14:00


Zolpidem Tartrate (Ambien) 5 mg PRN QHS  PRN PO INSOMNIA Last administered on 5/ 23/17at 23:50;  Start 5/23/17 at 12:30


Acetaminophen (Tylenol) 650 mg PRN Q6HRS  PRN PO FEVER;  Start 5/23/17 at 12:00


Ondansetron HCl (Zofran) 4 mg PRN Q6HRS  PRN IV NAUSEA/VOMITING Last 

administered on 5/23/17at 13:28;  Start 5/23/17 at 12:00


Pregabalin (Lyrica) 300 mg DAILY PO  Last administered on 5/24/17at 08:35;  

Start 5/23/17 at 12:30


Lidocaine (Lidoderm) 1 patch DAILY TD  Last administered on 5/23/17at 12:42;  

Start 5/23/17 at 12:15


Morphine Sulfate 4 mg PRN Q4HRS  PRN IV SEVERE PAIN Last administered on 5/24/ 17at 08:35;  Start 5/23/17 at 13:15


Enoxaparin Sodium (Lovenox 40mg Syringe) 40 mg DAILY16 SQ  Last administered on 

5/23/17at 15:31;  Start 5/23/17 at 16:00


Cyclobenzaprine HCl (Flexeril) 10 mg PRN Q6HRS  PRN PO MUSCLE SPASMS Last 

administered on 5/23/17at 23:50;  Start 5/23/17 at 14:00


Trimethoprim/ Sulfamethoxazole (Bactrim Ds) 1 tab BID PO  Last administered on 5 /24/17at 08:35;  Start 5/23/17 at 16:00


Oxycodone/ Acetaminophen (Percocet 5/325) 2 tab PRN Q4HRS  PRN PO MODERATE TO 

SEVERE PAIN Last administered on 5/24/17at 08:34;  Start 5/23/17 at 20:00





Active Scripts


Active


Reported


Tramadol Hcl 50 Mg Tablet 1 Tab PO BID


Alprazolam 1 Mg Tablet 1 Mg PO PRN Q6HRS PRN


Lyrica (Pregabalin) 150 Mg Capsule 150 Mg PO HS 30 Days


Trazodone Hcl 300 Mg Tablet 425 Mg PO HS


Ambien (Zolpidem Tartrate) 10 Mg Tablet 10 Mg PO HS


Levothyroxine Sodium 125 Mcg Tablet 125 Mcg PO DAILYAC


Lyrica (Pregabalin) 300 Mg Capsule 300 Mg PO DAILY


Effexor Xr (Venlafaxine Hcl) 150 Mg Cap.er.24h 300 Mg PO DAILY


Vitals/I & O





Vital Sign - Last 24 Hours








 5/23/17 5/23/17 5/23/17 5/23/17





 10:41 11:06 12:30 13:27


 


Temp  97.8  





  97.8  


 


Pulse  85  


 


Resp  18  


 


B/P (MAP)  118/61 (80)  


 


Pulse Ox 93 93 93 93


 


O2 Delivery Room Air Room Air Room Air Room Air


 


    





    





 5/23/17 5/23/17 5/23/17 5/23/17





 14:29 14:51 15:02 15:27


 


Temp  97.8  





  97.8  


 


Pulse  77  


 


Resp  17  18


 


B/P (MAP)  113/50 (71)  


 


Pulse Ox 93 92  98


 


O2 Delivery Room Air Room Air Room Air Room Air


 


    





    





 5/23/17 5/23/17 5/23/17 5/23/17





 19:56 20:00 20:00 22:20


 


Temp 98.5   





 98.5   


 


Pulse 77   


 


Resp 16   


 


B/P (MAP) 106/58 (74)   


 


Pulse Ox 93 93  93


 


O2 Delivery Room Air Room Air Room Air Room Air


 


    





    





 5/23/17 5/24/17 5/24/17 5/24/17





 23:46 03:30 07:22 07:50


 


Temp 98.1  97.5 





 98.1  97.5 


 


Pulse 77  81 


 


Resp 16  18 


 


B/P (MAP) 109/58 (75)  121/67 (85) 


 


Pulse Ox 94  92 


 


O2 Delivery Room Air Room Air Room Air Room Air


 


    





    





 5/24/17 5/24/17  





 08:34 08:35  


 


O2 Delivery Room Air Room Air  














Intake and Output   


 


 5/23/17 5/23/17 5/24/17





 15:00 23:00 07:00


 


Intake Total   720 ml


 


Balance   720 ml

















CARSON MADRID MD May 24, 2017 09:44

## 2017-05-24 NOTE — PDOC3
Discharge Summary [facility]


Date of Admission:  May 22, 2017


Discharge Date:  May 24, 2017


Admitting Diagnosis


1. lower back pain, 2/2 OA, or muscle spasm


2. chest pain, atypical, 2/2 anxiety likely


3 bipoloar disorder, 1


4. h/o lupus, no meds


5. likely pain meds seeker


6. obesity


7. hypothyroidism


Problems:  


Final Diagnosis





CONSULTS


card


dr. Bowman


Brief Hospital Course


Patient is a 56  year old female who presents by EMS for multiple reasons. 


Pt changed her first name since last year, as per her father, she is a pain 

seeker, worsening psych issues, and usually brayan to [facility] hosp.


Pt said she fell 5days ago with her little dog, and has severe lower back pain 

for 2 days, severe, which cause her hard to move bl lower ext,  intermittent 

shooting/tingling sensations through her pursed area buttock with bending and 

spontaneously. no Bowel or urination problem.


She also has some chest pressure pain at 9pm last night, radiating to left 

shoulder, with some sob, moderate, now pain free. 





Pt appears very uncomfortable, no tenderness at back. She require iv pain meds, 

refused po pain meds, saying morphine made her sick years ago, but required 

dilaudid to the nurse at night.





Pt refused PTOT saying she can walk fine but appears barely can walk to me. She 

refused Echo from Card saying her chest pain is gone.


She takes morphine iv fine wo side effects, and percocet 10mg wo problem. She 

is likely a pain meds seeker, appearing in pain, but no back tenderness, MRI neg

, agrees to go home today


give flexiril 15pills, percocet 5/325 20 pills.


dc time 35min 





GEN.:    appears very anxious in pain. Alert and oriented.No tenderness at 

back. 


HEENT:    Head is normocephalic, atraumatic


NECK:    Supple.  


LUNGS:    Clear to auscultation.


HEART:    RRR, S1, S2 present.  Peripheral pulses intact


ABDOMEN:    Soft, nontender.  Positive bowel sounds.


EXTREMITIES:    Without any cyanosis.    


NEUROLOGIC:     Normal speech, normal tone


PSYCHIATRIC:    Normal affect, normal mood.


SKIN:   No ulcerations








Problems:  


Disposition


home


CONDITION AT DISCHARGE:  Improved


Diet


regular


Scheduled


Levothyroxine Sodium (Levothyroxine Sodium), 125 MCG PO DAILYAC, (Reported)


Pregabalin (Lyrica), 300 MG PO DAILY, (Reported)


Pregabalin (Lyrica), 150 MG PO HS, (Reported)


Tramadol Hcl (Tramadol Hcl), 1 TAB PO BID, (Reported)


Trazodone Hcl (Trazodone Hcl), 425 MG PO HS, (Reported)


Venlafaxine Hcl (Effexor Xr), 300 MG PO DAILY, (Reported)


Zolpidem Tartrate (Ambien), 10 MG PO HS, (Reported)





Scheduled PRN


Alprazolam (Alprazolam), 1 MG PO PRN Q6HRS PRN for ANXIETY / AGITATION, (

Reported)


Cyclobenzaprine Hcl (Cyclobenzaprine Hcl), 10 MG PO PRN Q6HRS PRN for MUSCLE 

SPASMS


Oxycodone Hcl/Acetaminophen (Oxycodone-Acetaminophen 5-325), 2 TAB PO PRN Q4HRS 

PRN for MODERATE TO SEVERE PAIN


Follow Up


pcp IRIS Barlow MD May 24, 2017 13:12

## 2017-06-12 ENCOUNTER — HOSPITAL ENCOUNTER (INPATIENT)
Dept: HOSPITAL 61 - ER | Age: 57
LOS: 2 days | Discharge: HOME | DRG: 379 | End: 2017-06-14
Attending: INTERNAL MEDICINE | Admitting: INTERNAL MEDICINE
Payer: MEDICARE

## 2017-06-12 VITALS — WEIGHT: 225 LBS | HEIGHT: 69 IN | BODY MASS INDEX: 33.33 KG/M2

## 2017-06-12 VITALS — DIASTOLIC BLOOD PRESSURE: 79 MMHG | SYSTOLIC BLOOD PRESSURE: 107 MMHG

## 2017-06-12 VITALS — DIASTOLIC BLOOD PRESSURE: 72 MMHG | SYSTOLIC BLOOD PRESSURE: 99 MMHG

## 2017-06-12 VITALS — DIASTOLIC BLOOD PRESSURE: 63 MMHG | SYSTOLIC BLOOD PRESSURE: 119 MMHG

## 2017-06-12 VITALS — SYSTOLIC BLOOD PRESSURE: 134 MMHG | DIASTOLIC BLOOD PRESSURE: 73 MMHG

## 2017-06-12 DIAGNOSIS — F41.9: ICD-10-CM

## 2017-06-12 DIAGNOSIS — G62.9: ICD-10-CM

## 2017-06-12 DIAGNOSIS — M19.90: ICD-10-CM

## 2017-06-12 DIAGNOSIS — Z82.49: ICD-10-CM

## 2017-06-12 DIAGNOSIS — K25.4: Primary | ICD-10-CM

## 2017-06-12 DIAGNOSIS — E03.9: ICD-10-CM

## 2017-06-12 DIAGNOSIS — E66.9: ICD-10-CM

## 2017-06-12 DIAGNOSIS — Z90.49: ICD-10-CM

## 2017-06-12 DIAGNOSIS — F31.9: ICD-10-CM

## 2017-06-12 DIAGNOSIS — Z90.710: ICD-10-CM

## 2017-06-12 DIAGNOSIS — K44.9: ICD-10-CM

## 2017-06-12 DIAGNOSIS — Z88.8: ICD-10-CM

## 2017-06-12 DIAGNOSIS — Z79.899: ICD-10-CM

## 2017-06-12 DIAGNOSIS — Z79.52: ICD-10-CM

## 2017-06-12 DIAGNOSIS — Z80.9: ICD-10-CM

## 2017-06-12 DIAGNOSIS — Z87.11: ICD-10-CM

## 2017-06-12 DIAGNOSIS — M54.5: ICD-10-CM

## 2017-06-12 DIAGNOSIS — G89.29: ICD-10-CM

## 2017-06-12 DIAGNOSIS — M32.9: ICD-10-CM

## 2017-06-12 DIAGNOSIS — Z88.6: ICD-10-CM

## 2017-06-12 LAB
ALBUMIN SERPL-MCNC: 3.4 G/DL (ref 3.4–5)
ALBUMIN/GLOB SERPL: 1.1 {RATIO} (ref 1–1.7)
ALP SERPL-CCNC: 90 U/L (ref 46–116)
ALT SERPL-CCNC: 24 U/L (ref 14–59)
ANION GAP SERPL CALC-SCNC: 6 MMOL/L (ref 6–14)
AST SERPL-CCNC: 15 U/L (ref 15–37)
BASOPHILS # BLD AUTO: 0 X10^3/UL (ref 0–0.2)
BASOPHILS NFR BLD: 1 % (ref 0–3)
BILIRUB SERPL-MCNC: 0.2 MG/DL (ref 0.2–1)
BUN SERPL-MCNC: 16 MG/DL (ref 7–20)
BUN/CREAT SERPL: 18 (ref 6–20)
CALCIUM SERPL-MCNC: 8.4 MG/DL (ref 8.5–10.1)
CHLORIDE SERPL-SCNC: 103 MMOL/L (ref 98–107)
CK SERPL-CCNC: 31 U/L (ref 26–192)
CO2 SERPL-SCNC: 32 MMOL/L (ref 21–32)
CREAT SERPL-MCNC: 0.9 MG/DL (ref 0.6–1)
EOSINOPHIL NFR BLD: 2 % (ref 0–3)
ERYTHROCYTE [DISTWIDTH] IN BLOOD BY AUTOMATED COUNT: 15.2 % (ref 11.5–14.5)
GFR SERPLBLD BASED ON 1.73 SQ M-ARVRAT: 64.8 ML/MIN
GLOBULIN SER-MCNC: 3.2 G/DL (ref 2.2–3.8)
GLUCOSE SERPL-MCNC: 86 MG/DL (ref 70–99)
HCT VFR BLD CALC: 36.1 % (ref 36–47)
HGB BLD-MCNC: 12 G/DL (ref 12–15.5)
INR PPP: 1 (ref 0.8–1.1)
LYMPHOCYTES # BLD: 2.7 X10^3/UL (ref 1–4.8)
LYMPHOCYTES NFR BLD AUTO: 40 % (ref 24–48)
MAGNESIUM SERPL-MCNC: 2.2 MG/DL (ref 1.8–2.4)
MCH RBC QN AUTO: 30 PG (ref 25–35)
MCHC RBC AUTO-ENTMCNC: 33 G/DL (ref 31–37)
MCV RBC AUTO: 91 FL (ref 79–100)
MONOCYTES NFR BLD: 8 % (ref 0–9)
NEUTROPHILS NFR BLD AUTO: 50 % (ref 31–73)
PLATELET # BLD AUTO: 183 X10^3/UL (ref 140–400)
POTASSIUM SERPL-SCNC: 3.9 MMOL/L (ref 3.5–5.1)
PROT SERPL-MCNC: 6.6 G/DL (ref 6.4–8.2)
PROTHROMBIN TIME: 12.4 SEC (ref 11.7–14)
RBC # BLD AUTO: 3.96 X10^6/UL (ref 3.5–5.4)
SODIUM SERPL-SCNC: 141 MMOL/L (ref 136–145)
WBC # BLD AUTO: 6.6 X10^3/UL (ref 4–11)

## 2017-06-12 PROCEDURE — C9113 INJ PANTOPRAZOLE SODIUM, VIA: HCPCS

## 2017-06-12 PROCEDURE — 85610 PROTHROMBIN TIME: CPT

## 2017-06-12 PROCEDURE — 74176 CT ABD & PELVIS W/O CONTRAST: CPT

## 2017-06-12 PROCEDURE — 36415 COLL VENOUS BLD VENIPUNCTURE: CPT

## 2017-06-12 PROCEDURE — 80053 COMPREHEN METABOLIC PANEL: CPT

## 2017-06-12 PROCEDURE — 96374 THER/PROPH/DIAG INJ IV PUSH: CPT

## 2017-06-12 PROCEDURE — 86850 RBC ANTIBODY SCREEN: CPT

## 2017-06-12 PROCEDURE — 85730 THROMBOPLASTIN TIME PARTIAL: CPT

## 2017-06-12 PROCEDURE — 0DJ08ZZ INSPECTION OF UPPER INTESTINAL TRACT, VIA NATURAL OR ARTIFICIAL OPENING ENDOSCOPIC: ICD-10-PCS | Performed by: INTERNAL MEDICINE

## 2017-06-12 PROCEDURE — 83735 ASSAY OF MAGNESIUM: CPT

## 2017-06-12 PROCEDURE — 85027 COMPLETE CBC AUTOMATED: CPT

## 2017-06-12 PROCEDURE — 82550 ASSAY OF CK (CPK): CPT

## 2017-06-12 PROCEDURE — 96376 TX/PRO/DX INJ SAME DRUG ADON: CPT

## 2017-06-12 PROCEDURE — 86900 BLOOD TYPING SEROLOGIC ABO: CPT

## 2017-06-12 PROCEDURE — 96375 TX/PRO/DX INJ NEW DRUG ADDON: CPT

## 2017-06-12 PROCEDURE — 83690 ASSAY OF LIPASE: CPT

## 2017-06-12 PROCEDURE — 80048 BASIC METABOLIC PNL TOTAL CA: CPT

## 2017-06-12 PROCEDURE — 86901 BLOOD TYPING SEROLOGIC RH(D): CPT

## 2017-06-12 RX ADMIN — ZOLPIDEM TARTRATE SCH MG: 5 TABLET ORAL at 22:17

## 2017-06-12 RX ADMIN — SODIUM CHLORIDE, SODIUM LACTATE, POTASSIUM CHLORIDE, AND CALCIUM CHLORIDE SCH MLS/HR: .6; .31; .03; .02 INJECTION, SOLUTION INTRAVENOUS at 17:00

## 2017-06-12 RX ADMIN — ZOLPIDEM TARTRATE SCH MG: 5 TABLET ORAL at 20:44

## 2017-06-12 RX ADMIN — PREGABALIN SCH MG: 75 CAPSULE ORAL at 20:44

## 2017-06-12 RX ADMIN — PANTOPRAZOLE SODIUM SCH MG: 40 INJECTION, POWDER, FOR SOLUTION INTRAVENOUS at 16:30

## 2017-06-12 RX ADMIN — HYDROMORPHONE HYDROCHLORIDE PRN MG: 2 INJECTION INTRAMUSCULAR; INTRAVENOUS; SUBCUTANEOUS at 20:44

## 2017-06-12 RX ADMIN — BACITRACIN SCH MLS/HR: 5000 INJECTION, POWDER, FOR SOLUTION INTRAMUSCULAR at 22:17

## 2017-06-12 NOTE — PDOC1
History and Physical


Date of Admission


Date of Admission


17





Identification/Chief Complaint


Chief Complaint


coffee ground emesis


Problems:  





Source


Source:  Chart review, Patient





History of Present Illness


History of Present Illness





HPI


HPI





Patient is a 56  year old female presenting to the emergency department for 

evaluation of diffuse abdominal pain worse in her epigastrium and associated 

with coffee-ground emesis 2 episodes this morning.  


pt was Just DCed from here 2 weeks ago for chest pain with neg MPI, chronic 

back pain.


pain meds seeker.


Pt said she was taking prednisone 80mg daily in the past 2 weeks for pain 

control in [place]. then started to have abd pain yesterday, with coffee 

ground emesis x2 this am. The pain is mainly upper ABD, tenderness, no radiating

, 10/10. 


Pt very anxious, requires dilaudid and ativan iv now. Hb stable, CT abd neg. 


She said she had h/o gastric ulcer 2 years ago.





Past Medical History


Cardiovascular:  No pertinent hx


Pulmonary:  No pertinent hx


CENTRAL NERVOUS SYSTEM:  Periperal neuropathy


GI:  Peptic Ulcer disease


Heme/Onc:  Other


Hepatobiliary:  No pertinent hx


Psych:  Anxiety


Rheumatologic:  No pertinent hx


Infectious disease:  No pertinent hx


Renal/:  No pertinent hx


Endocrine:  Hypothyroidism





Past Surgical History


Past Surgical History:  Appendectomy, Arthroscopy, Cholecystectomy, , 

Hysterectomy, Other





Family History


Family History:  Hypertension





Social History


Smoke:  No


ALCOHOL:  rare


Drugs:  None





Current Problem List


Problem List


Problems


Medical Problems:


(1) Abdominal pain


Status: Acute  





(2) GI bleed


Status: Acute  











Current Medications


Current Medications





Current Medications








 Medications


  (Trade)  Dose


 Ordered  Sig/Sandy  Start Time


 Stop Time Status Last Admin


Dose Admin


 


 Fentanyl Citrate


  (Fentanyl 2ml


 Vial)  50 mcg  PRN Q2HR  PRN  17 07:45


 17 07:44  17 10:36


50 MCG


 


 Haloperidol


 Lactate


  (Haldol)  4 mg  1X  ONCE  17 08:45


 17 08:46 DC  


 


 


 Hydromorphone HCl


  (Dilaudid)  0.5 mg  PRN Q3HRS  PRN  17 13:00


     


 


 


 Lorazepam


  (Ativan)  0.5 mg  PRN Q6HRS  PRN  17 13:00


    17 13:18


0.5 MG


 


 Ondansetron HCl


  (Zofran)  4 mg  PRN Q8HRS  PRN  17 07:45


 17 07:44   


 


 


 Pantoprazole


 Sodium


  (Protonix Vial)  40 mg  BIDAC  17 16:30


     


 











Allergies


Allergies





Allergies








Coded Allergies Type Severity Reaction Last Updated Verified


 


  gabapentin Allergy Intermediate  17 Yes


 


  NSAIDS (Non-Steroidal Anti-Inflamma Allergy Mild Nausea and Vomiting 17 

Yes











ROS


Review of System


CONSTITUTIONAL:        No fever or chills


EYES:                          No recent changes


SKIN:               No rash or itching


CARDIOVASCULAR:     No chest pain, syncope, palpitations, or edema


RESPIRATORY:            No SOB or cough


GASTROINTESTINAL:    No nausea, vomiting or abdominal pain


NEUROLOGICAL:          No headaches or weakness


ENDOCRINE:               No cold or heat intolerance


GENITOURINARY:         No urgency or frequency of urination


MUSCULOSKELETAL:   No back pain or joint pain


LYMPHATICS:               No enlarged lymph nodes


PSYCHIATRIC:              No anxiety or depression





Physical Exam


Physical Exam


GEN.:    No apparent distress.  Alert and oriented.


HEENT:    Head is normocephalic, atraumatic


NECK:    Supple.  


LUNGS:    Clear to auscultation.


HEART:    RRR, S1, S2 present.  Peripheral pulses intact


ABDOMEN:    Soft, decreased bowel sounds. diffuse abd tenderness, mainly at 

epigastric area. 


EXTREMITIES:    Without any cyanosis.    


NEUROLOGIC:     Normal speech, normal tone


PSYCHIATRIC:    Normal affect, normal mood.


SKIN:   No ulcerations





Vitals


Vitals





Vital Signs








  Date Time  Temp Pulse Resp B/P (MAP) Pulse Ox O2 Delivery O2 Flow Rate FiO2


 


17 12:33   18  97 Room Air  


 


17 11:00 97.5 77  107/79 (88)    





 97.5       











Labs


Labs





Laboratory Tests








Test


  17


04:56


 


White Blood Count


  6.6 x10^3/uL


(4.0-11.0)


 


Red Blood Count


  3.96 x10^6/uL


(3.50-5.40)


 


Hemoglobin


  12.0 g/dL


(12.0-15.5)


 


Hematocrit


  36.1 %


(36.0-47.0)


 


Mean Corpuscular Volume 91 fL () 


 


Mean Corpuscular Hemoglobin 30 pg (25-35) 


 


Mean Corpuscular Hemoglobin


Concent 33 g/dL


(31-37)


 


Red Cell Distribution Width


  15.2 %


(11.5-14.5)


 


Platelet Count


  183 x10^3/uL


(140-400)


 


Neutrophils (%) (Auto) 50 % (31-73) 


 


Lymphocytes (%) (Auto) 40 % (24-48) 


 


Monocytes (%) (Auto) 8 % (0-9) 


 


Eosinophils (%) (Auto) 2 % (0-3) 


 


Basophils (%) (Auto) 1 % (0-3) 


 


Neutrophils # (Auto)


  3.3 x10^3uL


(1.8-7.7)


 


Lymphocytes # (Auto)


  2.7 x10^3/uL


(1.0-4.8)


 


Monocytes # (Auto)


  0.5 x10^3/uL


(0.0-1.1)


 


Eosinophils # (Auto)


  0.1 x10^3/uL


(0.0-0.7)


 


Basophils # (Auto)


  0.0 x10^3/uL


(0.0-0.2)


 


Prothrombin Time


  12.4 SEC


(11.7-14.0)


 


Prothromb Time International


Ratio 1.0 (0.8-1.1) 


 


 


Activated Partial


Thromboplast Time 29 SEC (24-38) 


 


 


Sodium Level


  141 mmol/L


(136-145)


 


Potassium Level


  3.9 mmol/L


(3.5-5.1)


 


Chloride Level


  103 mmol/L


()


 


Carbon Dioxide Level


  32 mmol/L


(21-32)


 


Anion Gap 6 (6-14) 


 


Blood Urea Nitrogen


  16 mg/dL


(7-20)


 


Creatinine


  0.9 mg/dL


(0.6-1.0)


 


Estimated GFR


(Cockcroft-Gault) 64.8 


 


 


BUN/Creatinine Ratio 18 (6-20) 


 


Glucose Level


  86 mg/dL


(70-99)


 


Calcium Level


  8.4 mg/dL


(8.5-10.1)


 


Magnesium Level


  2.2 mg/dL


(1.8-2.4)


 


Total Bilirubin


  0.2 mg/dL


(0.2-1.0)


 


Aspartate Amino Transf


(AST/SGOT) 15 U/L (15-37) 


 


 


Alanine Aminotransferase


(ALT/SGPT) 24 U/L (14-59) 


 


 


Alkaline Phosphatase


  90 U/L


()


 


Creatine Kinase


  31 U/L


()


 


Total Protein


  6.6 g/dL


(6.4-8.2)


 


Albumin


  3.4 g/dL


(3.4-5.0)


 


Albumin/Globulin Ratio 1.1 (1.0-1.7) 


 


Lipase


  98 U/L


()


 


Ethyl Alcohol Level


  < 10 mg/dL


(0-10)








Laboratory Tests








Test


  17


04:56


 


White Blood Count


  6.6 x10^3/uL


(4.0-11.0)


 


Red Blood Count


  3.96 x10^6/uL


(3.50-5.40)


 


Hemoglobin


  12.0 g/dL


(12.0-15.5)


 


Hematocrit


  36.1 %


(36.0-47.0)


 


Mean Corpuscular Volume 91 fL () 


 


Mean Corpuscular Hemoglobin 30 pg (25-35) 


 


Mean Corpuscular Hemoglobin


Concent 33 g/dL


(31-37)


 


Red Cell Distribution Width


  15.2 %


(11.5-14.5)


 


Platelet Count


  183 x10^3/uL


(140-400)


 


Neutrophils (%) (Auto) 50 % (31-73) 


 


Lymphocytes (%) (Auto) 40 % (24-48) 


 


Monocytes (%) (Auto) 8 % (0-9) 


 


Eosinophils (%) (Auto) 2 % (0-3) 


 


Basophils (%) (Auto) 1 % (0-3) 


 


Neutrophils # (Auto)


  3.3 x10^3uL


(1.8-7.7)


 


Lymphocytes # (Auto)


  2.7 x10^3/uL


(1.0-4.8)


 


Monocytes # (Auto)


  0.5 x10^3/uL


(0.0-1.1)


 


Eosinophils # (Auto)


  0.1 x10^3/uL


(0.0-0.7)


 


Basophils # (Auto)


  0.0 x10^3/uL


(0.0-0.2)


 


Prothrombin Time


  12.4 SEC


(11.7-14.0)


 


Prothromb Time International


Ratio 1.0 (0.8-1.1) 


 


 


Activated Partial


Thromboplast Time 29 SEC (24-38) 


 


 


Sodium Level


  141 mmol/L


(136-145)


 


Potassium Level


  3.9 mmol/L


(3.5-5.1)


 


Chloride Level


  103 mmol/L


()


 


Carbon Dioxide Level


  32 mmol/L


(21-32)


 


Anion Gap 6 (6-14) 


 


Blood Urea Nitrogen


  16 mg/dL


(7-20)


 


Creatinine


  0.9 mg/dL


(0.6-1.0)


 


Estimated GFR


(Cockcroft-Gault) 64.8 


 


 


BUN/Creatinine Ratio 18 (6-20) 


 


Glucose Level


  86 mg/dL


(70-99)


 


Calcium Level


  8.4 mg/dL


(8.5-10.1)


 


Magnesium Level


  2.2 mg/dL


(1.8-2.4)


 


Total Bilirubin


  0.2 mg/dL


(0.2-1.0)


 


Aspartate Amino Transf


(AST/SGOT) 15 U/L (15-37) 


 


 


Alanine Aminotransferase


(ALT/SGPT) 24 U/L (14-59) 


 


 


Alkaline Phosphatase


  90 U/L


()


 


Creatine Kinase


  31 U/L


()


 


Total Protein


  6.6 g/dL


(6.4-8.2)


 


Albumin


  3.4 g/dL


(3.4-5.0)


 


Albumin/Globulin Ratio 1.1 (1.0-1.7) 


 


Lipase


  98 U/L


()


 


Ethyl Alcohol Level


  < 10 mg/dL


(0-10)











VTE Prophylaxis Ordered


VTE Prophylaxis Devices:  Yes


VTE Pharmacological Prophylaxi:  No





Assessment/Plan


Assessment/Plan








1. coffee ground emesis with possible uGIB


2. chronic lower back pain, 2/2 OA, or muscle spasm


3 bipoloar disorder, 1


4. h/o lupus, no meds


5. likely pain meds seeker


6. obesity


7. hypothyroidism











plan:


fu with gi, EGD today


monitor hb tmr


ivf


npo for now


pain control


cont home meds


ppi bid











IRIS JEFFREY MD 2017 15:02

## 2017-06-12 NOTE — PDOC2
GI CONSULT


Reason For Consult:


GI Bleed





HPI:


HPI:


57 y/o admitted through the ER.  H/o GI bleed/PUD attributed to NSAIDs ~2 years 

ago in [place].  Had EGD and colonoscopy at that time, unclear re: cautery/

clip.  Was taking NSAIDs for lupus which were discontinued, then tried Imuran (

seems caused hepatotoxicity/?neutropenia), then was untreated for awhile, most 

recently has been taking Tramadol and prednisone (x2 weeks, was seen at another 

ER).  Has continued Protonix BID since.  Wasn't feeling well yesterday, then 

awoke w/ nausea overnight and began vomiting.  First looked like "blueberries" 

then coffee-grounds.  Significant upper abdominal pain.  Repeatedly requesting 

Dilaudid.  Labs: Hgb 12 w/ elevated RDW, INR 1, CMP unremarkable.  CT also 

unrevealing.  Was given IV PPI, kept NPO.  No diarrhea, constipation, 

hematochezia, melena.





PMH:


PMH:


SLE, anxiety/depression, PUD, hiatal hernia, peripheral neuropathy, , 

cholecystectomy, appendectomy, hysterectomy, breast augmentation x 2, right 

ankle surgery, left shoulder surgery





FH:


Family History:  Cancer (sister - breast)





Social History:


Smoke:  No


ALCOHOL:  rare


Drugs:  None





ROS:





GEN: Denies fevers, chills, sweats


HEENT: Denies blurred vision, sore throat


CV: Denies chest pain


RESP: Denies shortness of air, cough


GI: Per HPI


: Denies hematuria, dysuria


ENDO: Denies weight changes


NEURO: Denies confusion, dizziness


MSK: Denies weakness, joint pain/swelling


SKIN: Denies jaundice, pruritus





Vitals:


Vitals:





 Vital Signs








  Date Time  Temp Pulse Resp B/P (MAP) Pulse Ox O2 Delivery O2 Flow Rate FiO2


 


17 09:25  78 20 118/74 (89) 95 Room Air  


 


17 04:54 98.4       





 98.4       











Labs:


Labs:





Laboratory Tests








Test


  17


04:56


 


White Blood Count


  6.6 x10^3/uL


(4.0-11.0)


 


Red Blood Count


  3.96 x10^6/uL


(3.50-5.40)


 


Hemoglobin


  12.0 g/dL


(12.0-15.5)


 


Hematocrit


  36.1 %


(36.0-47.0)


 


Mean Corpuscular Volume 91 fL () 


 


Mean Corpuscular Hemoglobin 30 pg (25-35) 


 


Mean Corpuscular Hemoglobin


Concent 33 g/dL


(31-37)


 


Red Cell Distribution Width


  15.2 %


(11.5-14.5)


 


Platelet Count


  183 x10^3/uL


(140-400)


 


Neutrophils (%) (Auto) 50 % (31-73) 


 


Lymphocytes (%) (Auto) 40 % (24-48) 


 


Monocytes (%) (Auto) 8 % (0-9) 


 


Eosinophils (%) (Auto) 2 % (0-3) 


 


Basophils (%) (Auto) 1 % (0-3) 


 


Neutrophils # (Auto)


  3.3 x10^3uL


(1.8-7.7)


 


Lymphocytes # (Auto)


  2.7 x10^3/uL


(1.0-4.8)


 


Monocytes # (Auto)


  0.5 x10^3/uL


(0.0-1.1)


 


Eosinophils # (Auto)


  0.1 x10^3/uL


(0.0-0.7)


 


Basophils # (Auto)


  0.0 x10^3/uL


(0.0-0.2)


 


Prothrombin Time


  12.4 SEC


(11.7-14.0)


 


Prothromb Time International


Ratio 1.0 (0.8-1.1) 


 


 


Activated Partial


Thromboplast Time 29 SEC (24-38) 


 


 


Sodium Level


  141 mmol/L


(136-145)


 


Potassium Level


  3.9 mmol/L


(3.5-5.1)


 


Chloride Level


  103 mmol/L


()


 


Carbon Dioxide Level


  32 mmol/L


(21-32)


 


Anion Gap 6 (6-14) 


 


Blood Urea Nitrogen


  16 mg/dL


(7-20)


 


Creatinine


  0.9 mg/dL


(0.6-1.0)


 


Estimated GFR


(Cockcroft-Gault) 64.8 


 


 


BUN/Creatinine Ratio 18 (6-20) 


 


Glucose Level


  86 mg/dL


(70-99)


 


Calcium Level


  8.4 mg/dL


(8.5-10.1)


 


Magnesium Level


  2.2 mg/dL


(1.8-2.4)


 


Total Bilirubin


  0.2 mg/dL


(0.2-1.0)


 


Aspartate Amino Transf


(AST/SGOT) 15 U/L (15-37) 


 


 


Alanine Aminotransferase


(ALT/SGPT) 24 U/L (14-59) 


 


 


Alkaline Phosphatase


  90 U/L


()


 


Creatine Kinase


  31 U/L


()


 


Total Protein


  6.6 g/dL


(6.4-8.2)


 


Albumin


  3.4 g/dL


(3.4-5.0)


 


Albumin/Globulin Ratio 1.1 (1.0-1.7) 


 


Lipase


  98 U/L


()


 


Ethyl Alcohol Level


  < 10 mg/dL


(0-10)











Allergies:


Coded Allergies:  


     gabapentin (Verified  Allergy, Intermediate, 17)


     NSAIDS (Non-Steroidal Anti-Inflamma (Verified  Allergy, Mild, Nausea and 

Vomiting, 17)





Medications:





Current Medications








 Medications


  (Trade)  Dose


 Ordered  Sig/Sandy


 Route


 PRN Reason  Start Time


 Stop Time Status Last Admin


Dose Admin


 


 Hydromorphone HCl


  (Dilaudid)  1 mg  1X  ONCE


 IV


   17 06:00


 17 06:01 DC 17 06:05


 


 


 Ondansetron HCl


  (Zofran)  4 mg  1X  ONCE


 IV


   17 06:00


 17 06:01 DC 17 06:05


 


 


 Pantoprazole


 Sodium


  (Protonix Vial)  80 mg  1X  ONCE


 IVP


   17 07:00


 17 07:01 DC 17 07:17


 


 


 Hydromorphone HCl


  (Dilaudid)  1 mg  1X  ONCE


 IV


   17 07:45


 17 07:46 DC 17 07:41


 











Imaging:


Imaging:


CT A/P w/o contrast


IMPRESSION: 


1. Correlate for mild constipation.


2. Correlate for hysterectomy and appendectomy.


3. Small hiatal hernia containing mostly fat.





PE:





GEN: NAD, sitting up in bed, wearing her own pajamas, combing hair


HEENT: Atraumatic, PERRL


LUNGS: CTAB anteriorly


HEART: RRR


ABD: BS+, epigastric and BLQ discomfort, overweight


EXTREMITY: No edema


SKIN: No rashes, no jaundice


NEURO/PSYCH: A & O 3





A/P:


A/P:


Coffee-ground emesis, upper abd pain


-onset overnight, Hgb WNL 





H/o PUD


-similar symptoms w/ EGD ~2 years ago in [place], attributed to NSAIDs 


-has been on PPI BID since





CRC screen, h/o colon polyps


-colonoscopy in [place] ~2 years ago





SLE


-started prednisone ~2 weeks ago





--


Continue NPO, PPI.


EGD tonight r/o recurrent PUD.











NAHUN REED 2017 10:20

## 2017-06-12 NOTE — PDOC4
Operative Note


Operative Note


EGD 


Meds propofol per anesthesia


Pre-op dx coffee ground emesis


POst-op dx erosive gastritis


Plan medical therapy with ppi


       advance diet


       cbc in am to assess for ongoing bleeding











HUBERT TABARES MD Jun 12, 2017 17:13

## 2017-06-12 NOTE — ACF
Admit Criteria Forms


                                                                         


                           ABDOMINAL PAIN





Clinical Indications for Admission to Inpatient Care


 


                                                                               

      (Place 'X' for any and all applicable criteria):





Admission is indicated for ANY ONE of the following(1)(2)(3)(4)(5):


[X]I.      Inpatient admission required rather than observation care (Also use 

Abdominal Pain: Observation Care, 


           as appropriate) because of ANY ONE of the following:


            [ ]a)   Severe pain requiring acute inpatient management


            [X]b)    Identification of etiology/finding that requires inpatient 

care (eg, aortic dissection, free air)


            [ ]c)    Absent bowel sounds with complete ileus(6)  


            [ ]d)    Suspected toxic megacolon


            [ ]e)    Severe electrolyte abnormalities requiring inpatient care


            [ ]f)     High fever or infection requiring inpatient admission as 

indicated by ANY ONE of following(7)(8):


                       [ ] i)    Appropriate outpatient or observational care 

antimicrobial treatment unavailable, not effective, or not feasible


                       [ ] ii)   Documented bacteremia 


                       [ ] iii)  Temperature > 104.9 degrees F (oral)


                       [ ] iv)  T >103.1 F (oral) or < 96.8 F(rectal) that does 

not respond to all emergency treatment measures


            [ ]g)     Signs of intestinal obstruction [B] 


            [ ]h)     Hemodynamic instability


            [ ]i)      IV fluid to replace significant ongoing losses (greater 

than 3 L/m2 per day) (12)(13)


            [ ]j)      Percutaneous or open drainage (eg, abscess, biliary tract

) procedures


            [ ]k)     Parenteral nutrition regimen that must be implemented on 

inpatient basis   


            [ ]l)      Other condition,treatment or monitoring requiring 

inpatient admission.


[ ]II.      Peritoneal signs present


[ ]III.     Surgery needed that cannot be performed on an ambulatory basis.


[ ]IV.    Evaluation requires patient to not eat or drink for extended period (

eg, more than 24 hours).





[ ]V.     Contraindications and/or Inappropriate clinical situations for 

Observational Care in patients with


           abdominal pain, when ANY ONE of the following is required:


           [ ]a)  Thorough evaluation is required to prevent catastrophic 

events due to delays in diagnosing  


                   (e.g.Mesenteric ischemia) 1,3


           [ ]b)  Patient with severe pathology or with chronic symptoms 

unlikely to improve in the ED stay (3)


[ ]VI.    General contraindications and/or Inappropriate clinical situations 

for Observational Care in patients


           with abdominal pain, when ANY ONE of the following is required:


           [ ]a)   Prediction of prolongation of LOS based on ANY ONE of the 

following may be considered as 


                    a contraindication for observational care 2, 3, 4, 5, 6, 7, 

8, 9, 10, 11


                    [ ]i)    Age > 65 yrs.


                    [ ]ii)   Patient arriving by ambulance


                    [ ]iii)  Patient with high acuity


                    [ ]iv)  Patient requiring vital sign monitoring


                    [ ]v)   Patient on IV medication


           [ ]b)   Systolic blood pressures  180mmHg 3,12


           [ ]c)   Patient with altered mental status including delirium and 

other alteration of consciousness, (3)


           [ ]d)   Patient whose discharge disposition will be to a skilled 

nursing home or rehabilitation home should 


                    not be managed in Emergency Department Observation Unit. 

CMS rule requires 3 days hospital stay before such placement.3,13


           [ ]e)   Patient with failure to thrive due to broad array of 

etiologies 3,16,17


           [ ]f)    Inability to ambulate 3,14








Extended stay beyond goal length of stay may be needed for(2)(3):


[ ]a)   Persistent abdominal pain with suspected intra-abdominal process


[ ]b)   Diagnosed condition requiring continued stay (e.g., pancreatitis, 

complicated diverticulitis)                                            


[ ]c)   Surgery (e.g., colectomy)                


    





The original [employer] content created by [employer] has been revised. 


The portions of the content which have been revised are identified through the 

use of italic text or in bold, and [facility] [employer] 


has neither reviewed nor approved the modified material.All other unmodified 

content is copyright  [employer].





Please see references footnoted in the original [employer] edition 

2016











ORESTES MAYO Jun 12, 2017 08:54

## 2017-06-12 NOTE — PHYS DOC
Past Medical History


Past Medical History:  Depression, Other


Additional Past Medical Histor:  Lupus


Past Surgical History:  Appendectomy, Cholecystectomy, , Hysterectomy, 

Other


Additional Past Surgical Histo:  breast implants and removal, rt ankle


Alcohol Use:  Rarely


Drug Use:  None





Adult General


Chief Complaint


Chief Complaint:  ABDOMINAL PAIN





HPI


HPI





Patient is a 56  year old female presenting to the emergency department for 

evaluation of diffuse abdominal pain worse in her epigastrium and associated 

with coffee-ground emesis 2 episodes this morning.  Says that she is on 

steroids for her lupus and that she has had a GI bleed before in the past when 

she was on NSAIDs but she is not taking NSAIDs currently.  She denies any 

alcohol or blood thinner use.  Patient is demanding IV Dilaudid and says that 

she is very sick to her stomach.  She says that she had an EGD and colonoscopy 

in [facility] 2 years ago and she says the only thing it revealed 

was polyps.





Review of Systems


Review of Systems





Constitutional: Denies fever or chills []


Eyes: Denies change in visual acuity, redness, or eye pain []


HENT: Denies nasal congestion or sore throat []


Respiratory: Denies cough or shortness of breath []


Cardiovascular: No additional information not addressed in HPI []


GI: + abdominal pain, nausea, vomiting, diarrhea []


: Denies dysuria or hematuria []


Musculoskeletal: Denies back pain or joint pain []


Integument: Denies rash or skin lesions []


Neurologic: Denies headache, focal weakness or sensory changes []





Current Medications


Current Medications





Current Medications








 Medications


  (Trade)  Dose


 Ordered  Sig/Sandy  Start Time


 Stop Time Status Last Admin


Dose Admin


 


 Fentanyl Citrate


  (Fentanyl 2ml


 Vial)  50 mcg  PRN Q2HR  PRN  17 07:45


 17 07:44   


 


 


 Hydromorphone HCl


  (Dilaudid)  1 mg  1X  ONCE  17 07:45


 17 07:46 DC 17 07:41


1 MG


 


 Ondansetron HCl


  (Zofran)  4 mg  PRN Q8HRS  PRN  17 07:45


 17 07:44   


 


 


 Pantoprazole


 Sodium


  (Protonix Vial)  80 mg  1X  ONCE  17 07:00


 17 07:01 DC 17 07:17


80 MG











Allergies


Allergies





Allergies








Coded Allergies Type Severity Reaction Last Updated Verified


 


  gabapentin Allergy Intermediate  17 Yes


 


  NSAIDS (Non-Steroidal Anti-Inflamma Allergy Mild Nausea and Vomiting 17 

Yes











Physical Exam


Physical Exam





Constitutional: Well developed, well nourished, no acute distress, non-toxic 

appearance. []


HENT: Normocephalic, atraumatic, bilateral external ears normal, oropharynx 

moist, no oral exudates, nose normal. []


Eyes: PERRLA, EOMI, conjunctiva normal, no discharge. [] 


Neck: Normal range of motion, no tenderness, supple, no stridor. [] 


Cardiovascular:Heart rate regular rhythm, no murmur []


Lungs & Thorax:  Bilateral breath sounds clear to auscultation []


Abdomen: Bowel sounds normal, soft, positive diffuse tenderness, no rebound or 

guarding, no masses, no pulsatile masses. [] 


Skin: Warm, dry, no erythema, no rash. [] 


Back: No tenderness, no CVA tenderness. [] 


Extremities: No tenderness, no cyanosis, no clubbing, ROM intact, no edema. [] 


Neurologic: Alert and oriented X 3, normal motor function, normal sensory 

function, no focal deficits noted. []





Current Patient Data


Vital Signs





 Vital Signs








  Date Time  Temp Pulse Resp B/P (MAP) Pulse Ox O2 Delivery O2 Flow Rate FiO2


 


17 07:41   18  96 Room Air  


 


17 04:54 98.4 87  119/64 (82)    





 98.4       








Lab Values





 Laboratory Tests








Test


  17


04:56


 


White Blood Count


  6.6 x10^3/uL


(4.0-11.0)


 


Red Blood Count


  3.96 x10^6/uL


(3.50-5.40)


 


Hemoglobin


  12.0 g/dL


(12.0-15.5)


 


Hematocrit


  36.1 %


(36.0-47.0)


 


Mean Corpuscular Volume


  91 fL ()


 


 


Mean Corpuscular Hemoglobin 30 pg (25-35)  


 


Mean Corpuscular Hemoglobin


Concent 33 g/dL


(31-37)


 


Red Cell Distribution Width


  15.2 %


(11.5-14.5)  H


 


Platelet Count


  183 x10^3/uL


(140-400)


 


Neutrophils (%) (Auto) 50 % (31-73)  


 


Lymphocytes (%) (Auto) 40 % (24-48)  


 


Monocytes (%) (Auto) 8 % (0-9)  


 


Eosinophils (%) (Auto) 2 % (0-3)  


 


Basophils (%) (Auto) 1 % (0-3)  


 


Neutrophils # (Auto)


  3.3 x10^3uL


(1.8-7.7)


 


Lymphocytes # (Auto)


  2.7 x10^3/uL


(1.0-4.8)


 


Monocytes # (Auto)


  0.5 x10^3/uL


(0.0-1.1)


 


Eosinophils # (Auto)


  0.1 x10^3/uL


(0.0-0.7)


 


Basophils # (Auto)


  0.0 x10^3/uL


(0.0-0.2)


 


Prothrombin Time


  12.4 SEC


(11.7-14.0)


 


Prothrombin Time INR 1.0 (0.8-1.1)  


 


PTT


  29 SEC (24-38)


 


 


Sodium Level


  141 mmol/L


(136-145)


 


Potassium Level


  3.9 mmol/L


(3.5-5.1)


 


Chloride Level


  103 mmol/L


()


 


Carbon Dioxide Level


  32 mmol/L


(21-32)


 


Anion Gap 6 (6-14)  


 


Blood Urea Nitrogen


  16 mg/dL


(7-20)


 


Creatinine


  0.9 mg/dL


(0.6-1.0)


 


Estimated GFR


(Cockcroft-Gault) 64.8  


 


 


BUN/Creatinine Ratio 18 (6-20)  


 


Glucose Level


  86 mg/dL


(70-99)


 


Calcium Level


  8.4 mg/dL


(8.5-10.1)  L


 


Magnesium Level


  2.2 mg/dL


(1.8-2.4)


 


Total Bilirubin


  0.2 mg/dL


(0.2-1.0)


 


Aspartate Amino Transferase


(AST) 15 U/L (15-37)


 


 


Alanine Aminotransferase (ALT)


  24 U/L (14-59)


 


 


Alkaline Phosphatase


  90 U/L


()


 


Creatine Kinase


  31 U/L


()


 


Total Protein


  6.6 g/dL


(6.4-8.2)


 


Albumin


  3.4 g/dL


(3.4-5.0)


 


Albumin/Globulin Ratio 1.1 (1.0-1.7)  


 


Lipase


  98 U/L


()


 


Ethyl Alcohol Level


  < 10 mg/dL


(0-10)





 Laboratory Tests


17 04:56








 Laboratory Tests


17 04:56














EKG


EKG


[]





Radiology/Procedures


Radiology/Procedures


EXAM: CT abdomen/pelvis without contrast.





HISTORY: Lower abdominal pain.





TECHNIQUE: Computed tomography of the abdomen and pelvis was performed without


intravenous contrast.





COMPARISON: None.





FINDINGS: Lung windows through the visualized portions of the bases reveal a


small hiatal hernia containing mostly fat. Bone windows reveal no suspicious


lesions.





Cholecystectomy clips are noted. The liver, pancreas, adrenal glands and


spleen are unremarkable without contrast. A cyst in the left kidney measures


2.2 cm.





The uterus is surgically absent. Stool throughout the colon is consistent with


constipation. The appendix is not visualized and is likely surgically absent.


There is no obstruction. No inflammatory changes are seen. There are no


pathologically enlarged lymph nodes.





IMPRESSION: 


1. Correlate for mild constipation.


2. Correlate for hysterectomy and appendectomy.


3. Small hiatal hernia containing mostly fat.








*One or more of the following individualized dose reduction techniques were


utilized for this examination:  


1. Automated exposure control.  


2. Adjustment of the mA and/or kV according to patient size.  


3. Use of iterative reconstruction technique.




















DICTATED and SIGNED BY:     ARCADIO BATEMAN MD


DATE:     17





Course & Med Decision Making


Course & Med Decision Making


Patient with possible upper GI bleed. We'll get labs checked symptoms and 

reassess.





I spoke to Dr. Landry and he said that he would likely scope her later on 

today.  Patient admitted in stable condition with no needs for transfusion or 

emergent treatment.





Dragon Disclaimer


Dragon Disclaimer


This electronic medical record was generated, in whole or in part, using a 

voice recognition dictation system.





Departure


Departure


Impression:  


 Primary Impression:  


 GI bleed


 Additional Impression:  


 Abdominal pain


Disposition:  09 ADMITTED AS INPATIENT


Admitting Physician:  Pati Pabon


Condition:  STABLE


Referrals:  


NO PCP (PCP)





Problem Qualifiers








 Primary Impression:  


 GI bleed


 GI bleed type/associated pathology:  unspecified gastrointestinal hemorrhage 

type  Qualified Codes:  K92.2 - Gastrointestinal hemorrhage, unspecified








NATE MOELLER DO 2017 06:43

## 2017-06-12 NOTE — RAD
EXAM: CT abdomen/pelvis without contrast.



HISTORY: Lower abdominal pain.



TECHNIQUE: Computed tomography of the abdomen and pelvis was performed without

intravenous contrast.



COMPARISON: None.



FINDINGS: Lung windows through the visualized portions of the bases reveal a

small hiatal hernia containing mostly fat. Bone windows reveal no suspicious

lesions.



Cholecystectomy clips are noted. The liver, pancreas, adrenal glands and

spleen are unremarkable without contrast. A cyst in the left kidney measures

2.2 cm.



The uterus is surgically absent. Stool throughout the colon is consistent with

constipation. The appendix is not visualized and is likely surgically absent.

There is no obstruction. No inflammatory changes are seen. There are no

pathologically enlarged lymph nodes.



IMPRESSION: 

1. Correlate for mild constipation.

2. Correlate for hysterectomy and appendectomy.

3. Small hiatal hernia containing mostly fat.





*One or more of the following individualized dose reduction techniques were

utilized for this examination:  

1. Automated exposure control.  

2. Adjustment of the mA and/or kV according to patient size.  

3. Use of iterative reconstruction technique.

## 2017-06-13 VITALS — SYSTOLIC BLOOD PRESSURE: 136 MMHG | DIASTOLIC BLOOD PRESSURE: 77 MMHG

## 2017-06-13 VITALS — SYSTOLIC BLOOD PRESSURE: 119 MMHG | DIASTOLIC BLOOD PRESSURE: 74 MMHG

## 2017-06-13 VITALS — DIASTOLIC BLOOD PRESSURE: 73 MMHG | SYSTOLIC BLOOD PRESSURE: 125 MMHG

## 2017-06-13 VITALS — SYSTOLIC BLOOD PRESSURE: 152 MMHG | DIASTOLIC BLOOD PRESSURE: 102 MMHG

## 2017-06-13 VITALS — SYSTOLIC BLOOD PRESSURE: 115 MMHG | DIASTOLIC BLOOD PRESSURE: 79 MMHG

## 2017-06-13 VITALS — DIASTOLIC BLOOD PRESSURE: 66 MMHG | SYSTOLIC BLOOD PRESSURE: 106 MMHG

## 2017-06-13 LAB
ANION GAP SERPL CALC-SCNC: 4 MMOL/L (ref 6–14)
BASOPHILS # BLD AUTO: 0 X10^3/UL (ref 0–0.2)
BASOPHILS NFR BLD: 0 % (ref 0–3)
BUN SERPL-MCNC: 14 MG/DL (ref 7–20)
CALCIUM SERPL-MCNC: 8.6 MG/DL (ref 8.5–10.1)
CHLORIDE SERPL-SCNC: 103 MMOL/L (ref 98–107)
CO2 SERPL-SCNC: 32 MMOL/L (ref 21–32)
CREAT SERPL-MCNC: 0.6 MG/DL (ref 0.6–1)
EOSINOPHIL NFR BLD: 0 % (ref 0–3)
ERYTHROCYTE [DISTWIDTH] IN BLOOD BY AUTOMATED COUNT: 15.3 % (ref 11.5–14.5)
GFR SERPLBLD BASED ON 1.73 SQ M-ARVRAT: 103.4 ML/MIN
GLUCOSE SERPL-MCNC: 115 MG/DL (ref 70–99)
HCT VFR BLD CALC: 38.7 % (ref 36–47)
HGB BLD-MCNC: 13.4 G/DL (ref 12–15.5)
LYMPHOCYTES # BLD: 1.1 X10^3/UL (ref 1–4.8)
LYMPHOCYTES NFR BLD AUTO: 15 % (ref 24–48)
MCH RBC QN AUTO: 31 PG (ref 25–35)
MCHC RBC AUTO-ENTMCNC: 35 G/DL (ref 31–37)
MCV RBC AUTO: 90 FL (ref 79–100)
MONOCYTES NFR BLD: 6 % (ref 0–9)
NEUTROPHILS NFR BLD AUTO: 79 % (ref 31–73)
PLATELET # BLD AUTO: 182 X10^3/UL (ref 140–400)
POTASSIUM SERPL-SCNC: 4.6 MMOL/L (ref 3.5–5.1)
RBC # BLD AUTO: 4.32 X10^6/UL (ref 3.5–5.4)
SODIUM SERPL-SCNC: 139 MMOL/L (ref 136–145)
WBC # BLD AUTO: 7.8 X10^3/UL (ref 4–11)

## 2017-06-13 RX ADMIN — ONDANSETRON PRN MG: 2 INJECTION INTRAMUSCULAR; INTRAVENOUS at 05:50

## 2017-06-13 RX ADMIN — BACITRACIN SCH MLS/HR: 5000 INJECTION, POWDER, FOR SOLUTION INTRAMUSCULAR at 04:20

## 2017-06-13 RX ADMIN — HYDROMORPHONE HYDROCHLORIDE PRN MG: 2 INJECTION INTRAMUSCULAR; INTRAVENOUS; SUBCUTANEOUS at 20:27

## 2017-06-13 RX ADMIN — METOCLOPRAMIDE PRN MG: 5 INJECTION, SOLUTION INTRAMUSCULAR; INTRAVENOUS at 17:00

## 2017-06-13 RX ADMIN — HYDROMORPHONE HYDROCHLORIDE PRN MG: 2 INJECTION INTRAMUSCULAR; INTRAVENOUS; SUBCUTANEOUS at 17:00

## 2017-06-13 RX ADMIN — VENLAFAXINE SCH MG: 50 TABLET ORAL at 22:07

## 2017-06-13 RX ADMIN — LEVOTHYROXINE SODIUM SCH MCG: 125 TABLET ORAL at 05:48

## 2017-06-13 RX ADMIN — PREGABALIN SCH MG: 75 CAPSULE ORAL at 08:42

## 2017-06-13 RX ADMIN — HYDROMORPHONE HYDROCHLORIDE PRN MG: 2 INJECTION INTRAMUSCULAR; INTRAVENOUS; SUBCUTANEOUS at 23:38

## 2017-06-13 RX ADMIN — ZOLPIDEM TARTRATE SCH MG: 5 TABLET ORAL at 21:00

## 2017-06-13 RX ADMIN — HYDROMORPHONE HYDROCHLORIDE PRN MG: 2 INJECTION INTRAMUSCULAR; INTRAVENOUS; SUBCUTANEOUS at 00:23

## 2017-06-13 RX ADMIN — HYDROMORPHONE HYDROCHLORIDE PRN MG: 2 INJECTION INTRAMUSCULAR; INTRAVENOUS; SUBCUTANEOUS at 05:50

## 2017-06-13 RX ADMIN — VENLAFAXINE SCH MG: 50 TABLET ORAL at 08:42

## 2017-06-13 RX ADMIN — PREGABALIN SCH MG: 75 CAPSULE ORAL at 22:09

## 2017-06-13 RX ADMIN — VENLAFAXINE SCH MG: 50 TABLET ORAL at 14:00

## 2017-06-13 RX ADMIN — BACITRACIN SCH MLS/HR: 5000 INJECTION, POWDER, FOR SOLUTION INTRAMUSCULAR at 13:43

## 2017-06-13 RX ADMIN — PANTOPRAZOLE SODIUM SCH MG: 40 TABLET, DELAYED RELEASE ORAL at 17:01

## 2017-06-13 RX ADMIN — ONDANSETRON PRN MG: 2 INJECTION INTRAMUSCULAR; INTRAVENOUS at 13:41

## 2017-06-13 RX ADMIN — ZOLPIDEM TARTRATE SCH MG: 5 TABLET ORAL at 23:04

## 2017-06-13 RX ADMIN — PANTOPRAZOLE SODIUM SCH MG: 40 INJECTION, POWDER, FOR SOLUTION INTRAVENOUS at 08:41

## 2017-06-13 RX ADMIN — SODIUM CHLORIDE, SODIUM LACTATE, POTASSIUM CHLORIDE, AND CALCIUM CHLORIDE SCH MLS/HR: .6; .31; .03; .02 INJECTION, SOLUTION INTRAVENOUS at 00:57

## 2017-06-13 NOTE — PDOC
PROGRESS NOTES


Chief Complaint


Chief Complaint


1. coffee ground emesis with erosive gastritis on EGD


2. chronic lower back pain, 2/2 OA, or muscle spasm


3 bipoloar disorder, 1


4. h/o lupus, no meds


5. likely pain meds seeker


6. obesity


7. hypothyroidism











plan:


fu with gi, EGD 6/13


monitor hb tmr


ivf


regular diet as per gi, but pt refused. consider change to gi soft if ok with gi


pain control


cont home meds


ppi bid


dc tmr if ok with gi





History of Present Illness


History of Present Illness


stating still severe abd pain


Hb high





Vitals


Vitals





Vital Signs








  Date Time  Temp Pulse Resp B/P (MAP) Pulse Ox O2 Delivery O2 Flow Rate FiO2


 


6/13/17 11:00 98.6 96 18 115/79 (91) 88 Room Air  





 98.6       


 


6/13/17 10:59       2.0 











Physical Exam


General:  Alert, Oriented X3, Cooperative


Heart:  Regular rate, Normal S1, Normal S2


Lungs:  Clear


Abdomen:  Normal bowel sounds, Soft, Other (epigastric area mild tenderness)


Extremities:  No clubbing, No cyanosis


Skin:  No rashes





Labs


LABS





Laboratory Tests








Test


  6/13/17


09:00


 


White Blood Count


  7.8 x10^3/uL


(4.0-11.0)


 


Red Blood Count


  4.32 x10^6/uL


(3.50-5.40)


 


Hemoglobin


  13.4 g/dL


(12.0-15.5)


 


Hematocrit


  38.7 %


(36.0-47.0)


 


Mean Corpuscular Volume 90 fL () 


 


Mean Corpuscular Hemoglobin 31 pg (25-35) 


 


Mean Corpuscular Hemoglobin


Concent 35 g/dL


(31-37)


 


Red Cell Distribution Width


  15.3 %


(11.5-14.5)


 


Platelet Count


  182 x10^3/uL


(140-400)


 


Neutrophils (%) (Auto) 79 % (31-73) 


 


Lymphocytes (%) (Auto) 15 % (24-48) 


 


Monocytes (%) (Auto) 6 % (0-9) 


 


Eosinophils (%) (Auto) 0 % (0-3) 


 


Basophils (%) (Auto) 0 % (0-3) 


 


Neutrophils # (Auto)


  6.2 x10^3uL


(1.8-7.7)


 


Lymphocytes # (Auto)


  1.1 x10^3/uL


(1.0-4.8)


 


Monocytes # (Auto)


  0.4 x10^3/uL


(0.0-1.1)


 


Eosinophils # (Auto)


  0.0 x10^3/uL


(0.0-0.7)


 


Basophils # (Auto)


  0.0 x10^3/uL


(0.0-0.2)


 


Sodium Level


  139 mmol/L


(136-145)


 


Potassium Level


  4.6 mmol/L


(3.5-5.1)


 


Chloride Level


  103 mmol/L


()


 


Carbon Dioxide Level


  32 mmol/L


(21-32)


 


Anion Gap 4 (6-14) 


 


Blood Urea Nitrogen


  14 mg/dL


(7-20)


 


Creatinine


  0.6 mg/dL


(0.6-1.0)


 


Estimated GFR


(Cockcroft-Gault) 103.4 


 


 


Glucose Level


  115 mg/dL


(70-99)


 


Calcium Level


  8.6 mg/dL


(8.5-10.1)











Review of Systems


Review of Systems


no fever, chills, sob or chest pain





Assessment and Plan


Assessmemt and Plan


Problems


Medical Problems:


(1) Abdominal pain


Status: Acute  





(2) GI bleed


Status: Acute  








Problems:  





Comment


Review of Relevant


I have reviewed the following items wyatt (where applicable) has been applied.


Labs





Laboratory Tests








Test


  6/12/17


04:56 6/13/17


09:00


 


White Blood Count


  6.6 x10^3/uL


(4.0-11.0) 7.8 x10^3/uL


(4.0-11.0)


 


Red Blood Count


  3.96 x10^6/uL


(3.50-5.40) 4.32 x10^6/uL


(3.50-5.40)


 


Hemoglobin


  12.0 g/dL


(12.0-15.5) 13.4 g/dL


(12.0-15.5)


 


Hematocrit


  36.1 %


(36.0-47.0) 38.7 %


(36.0-47.0)


 


Mean Corpuscular Volume 91 fL ()  90 fL () 


 


Mean Corpuscular Hemoglobin 30 pg (25-35)  31 pg (25-35) 


 


Mean Corpuscular Hemoglobin


Concent 33 g/dL


(31-37) 35 g/dL


(31-37)


 


Red Cell Distribution Width


  15.2 %


(11.5-14.5) 15.3 %


(11.5-14.5)


 


Platelet Count


  183 x10^3/uL


(140-400) 182 x10^3/uL


(140-400)


 


Neutrophils (%) (Auto) 50 % (31-73)  79 % (31-73) 


 


Lymphocytes (%) (Auto) 40 % (24-48)  15 % (24-48) 


 


Monocytes (%) (Auto) 8 % (0-9)  6 % (0-9) 


 


Eosinophils (%) (Auto) 2 % (0-3)  0 % (0-3) 


 


Basophils (%) (Auto) 1 % (0-3)  0 % (0-3) 


 


Neutrophils # (Auto)


  3.3 x10^3uL


(1.8-7.7) 6.2 x10^3uL


(1.8-7.7)


 


Lymphocytes # (Auto)


  2.7 x10^3/uL


(1.0-4.8) 1.1 x10^3/uL


(1.0-4.8)


 


Monocytes # (Auto)


  0.5 x10^3/uL


(0.0-1.1) 0.4 x10^3/uL


(0.0-1.1)


 


Eosinophils # (Auto)


  0.1 x10^3/uL


(0.0-0.7) 0.0 x10^3/uL


(0.0-0.7)


 


Basophils # (Auto)


  0.0 x10^3/uL


(0.0-0.2) 0.0 x10^3/uL


(0.0-0.2)


 


Prothrombin Time


  12.4 SEC


(11.7-14.0) 


 


 


Prothromb Time International


Ratio 1.0 (0.8-1.1) 


  


 


 


Activated Partial


Thromboplast Time 29 SEC (24-38) 


  


 


 


Sodium Level


  141 mmol/L


(136-145) 139 mmol/L


(136-145)


 


Potassium Level


  3.9 mmol/L


(3.5-5.1) 4.6 mmol/L


(3.5-5.1)


 


Chloride Level


  103 mmol/L


() 103 mmol/L


()


 


Carbon Dioxide Level


  32 mmol/L


(21-32) 32 mmol/L


(21-32)


 


Anion Gap 6 (6-14)  4 (6-14) 


 


Blood Urea Nitrogen


  16 mg/dL


(7-20) 14 mg/dL


(7-20)


 


Creatinine


  0.9 mg/dL


(0.6-1.0) 0.6 mg/dL


(0.6-1.0)


 


Estimated GFR


(Cockcroft-Gault) 64.8 


  103.4 


 


 


BUN/Creatinine Ratio 18 (6-20)  


 


Glucose Level


  86 mg/dL


(70-99) 115 mg/dL


(70-99)


 


Calcium Level


  8.4 mg/dL


(8.5-10.1) 8.6 mg/dL


(8.5-10.1)


 


Magnesium Level


  2.2 mg/dL


(1.8-2.4) 


 


 


Total Bilirubin


  0.2 mg/dL


(0.2-1.0) 


 


 


Aspartate Amino Transf


(AST/SGOT) 15 U/L (15-37) 


  


 


 


Alanine Aminotransferase


(ALT/SGPT) 24 U/L (14-59) 


  


 


 


Alkaline Phosphatase


  90 U/L


() 


 


 


Creatine Kinase


  31 U/L


() 


 


 


Total Protein


  6.6 g/dL


(6.4-8.2) 


 


 


Albumin


  3.4 g/dL


(3.4-5.0) 


 


 


Albumin/Globulin Ratio 1.1 (1.0-1.7)  


 


Lipase


  98 U/L


() 


 


 


Ethyl Alcohol Level


  < 10 mg/dL


(0-10) 


 








Laboratory Tests








Test


  6/13/17


09:00


 


White Blood Count


  7.8 x10^3/uL


(4.0-11.0)


 


Red Blood Count


  4.32 x10^6/uL


(3.50-5.40)


 


Hemoglobin


  13.4 g/dL


(12.0-15.5)


 


Hematocrit


  38.7 %


(36.0-47.0)


 


Mean Corpuscular Volume 90 fL () 


 


Mean Corpuscular Hemoglobin 31 pg (25-35) 


 


Mean Corpuscular Hemoglobin


Concent 35 g/dL


(31-37)


 


Red Cell Distribution Width


  15.3 %


(11.5-14.5)


 


Platelet Count


  182 x10^3/uL


(140-400)


 


Neutrophils (%) (Auto) 79 % (31-73) 


 


Lymphocytes (%) (Auto) 15 % (24-48) 


 


Monocytes (%) (Auto) 6 % (0-9) 


 


Eosinophils (%) (Auto) 0 % (0-3) 


 


Basophils (%) (Auto) 0 % (0-3) 


 


Neutrophils # (Auto)


  6.2 x10^3uL


(1.8-7.7)


 


Lymphocytes # (Auto)


  1.1 x10^3/uL


(1.0-4.8)


 


Monocytes # (Auto)


  0.4 x10^3/uL


(0.0-1.1)


 


Eosinophils # (Auto)


  0.0 x10^3/uL


(0.0-0.7)


 


Basophils # (Auto)


  0.0 x10^3/uL


(0.0-0.2)


 


Sodium Level


  139 mmol/L


(136-145)


 


Potassium Level


  4.6 mmol/L


(3.5-5.1)


 


Chloride Level


  103 mmol/L


()


 


Carbon Dioxide Level


  32 mmol/L


(21-32)


 


Anion Gap 4 (6-14) 


 


Blood Urea Nitrogen


  14 mg/dL


(7-20)


 


Creatinine


  0.6 mg/dL


(0.6-1.0)


 


Estimated GFR


(Cockcroft-Gault) 103.4 


 


 


Glucose Level


  115 mg/dL


(70-99)


 


Calcium Level


  8.6 mg/dL


(8.5-10.1)








Medications





Current Medications


Hydromorphone HCl (Dilaudid) 1 mg 1X  ONCE IV  Last administered on 6/12/17at 06

:05;  Start 6/12/17 at 06:00;  Stop 6/12/17 at 06:01;  Status DC


Ondansetron HCl (Zofran) 4 mg 1X  ONCE IV  Last administered on 6/12/17at 06:05

;  Start 6/12/17 at 06:00;  Stop 6/12/17 at 06:01;  Status DC


Pantoprazole Sodium (Protonix Vial) 80 mg 1X  ONCE IVP  Last administered on 6/ 12/17at 07:17;  Start 6/12/17 at 07:00;  Stop 6/12/17 at 07:01;  Status DC


Hydromorphone HCl (Dilaudid) 1 mg 1X  ONCE IV  Last administered on 6/12/17at 07

:41;  Start 6/12/17 at 07:45;  Stop 6/12/17 at 07:46;  Status DC


Ondansetron HCl (Zofran) 4 mg PRN Q8HRS  PRN IV NAUSEA/VOMITING;  Start 6/12/17 

at 07:45;  Stop 6/13/17 at 07:44;  Status DC


Fentanyl Citrate (Fentanyl 2ml Vial) 50 mcg PRN Q2HR  PRN IV PAIN Last 

administered on 6/12/17at 10:36;  Start 6/12/17 at 07:45;  Stop 6/13/17 at 07:44

;  Status DC


Haloperidol Lactate (Haldol) 4 mg 1X  ONCE IVP ;  Start 6/12/17 at 08:45;  Stop 

6/12/17 at 08:46;  Status DC


Pantoprazole Sodium (Protonix Vial) 40 mg BIDAC IVP ;  Start 6/12/17 at 10:30;  

Status Cancel


Pantoprazole Sodium (Protonix Vial) 40 mg BIDAC IVP  Last administered on 6/13/ 17at 08:41;  Start 6/12/17 at 16:30;  Stop 6/13/17 at 12:26;  Status DC


Hydromorphone HCl (Dilaudid) 0.5 mg PRN Q4HRS  PRN IV PAIN Last administered on 

6/12/17at 12:33;  Start 6/12/17 at 12:15;  Stop 6/12/17 at 12:54;  Status DC


Hydromorphone HCl (Dilaudid) 0.5 mg PRN Q3HRS  PRN IV SEVERE PAIN Last 

administered on 6/12/17at 16:01;  Start 6/12/17 at 13:00;  Stop 6/12/17 at 20:04

;  Status DC


Lorazepam (Ativan) 0.5 mg PRN Q6HRS  PRN IV ANXIETY / AGITATION Last 

administered on 6/12/17at 13:18;  Start 6/12/17 at 13:00


Alprazolam (Xanax) 1 mg PRN Q6HRS  PRN PO ANXIETY / AGITATION Last administered 

on 6/13/17at 00:26;  Start 6/12/17 at 15:00


Carbamazepine (TEGretol) 200 mg DAILY PO  Last administered on 6/13/17at 08:42;

  Start 6/12/17 at 15:30


Cyclobenzaprine HCl (Flexeril) 10 mg PRN Q6HRS  PRN PO MUSCLE SPASMS;  Start 6/ 12/17 at 15:00


Levothyroxine Sodium (Synthroid) 125 mcg DAILY06 PO ;  Start 6/13/17 at 06:00


Oxycodone/ Acetaminophen (Percocet 5/325) 2 tab PRN Q4HRS  PRN PO MODERATE TO 

SEVERE PAIN;  Start 6/12/17 at 15:00


Tramadol HCl (Ultram) 50 mg BID PO  Last administered on 6/13/17at 08:43;  

Start 6/12/17 at 16:00


Zolpidem Tartrate (Ambien) 5 mg QHS PO  Last administered on 6/12/17at 22:17;  

Start 6/12/17 at 21:00


Alprazolam (Xanax) 2 mg TID PO  Last administered on 6/13/17at 08:43;  Start 6/ 12/17 at 16:00


Pregabalin (Lyrica) 150 mg BID PO  Last administered on 6/13/17at 08:42;  Start 

6/12/17 at 21:00


Venlafaxine HCl (Effexor) 100 mg TID PO  Last administered on 6/13/17at 08:42;  

Start 6/13/17 at 09:00


Acetaminophen (Tylenol) 650 mg PRN Q6HRS  PRN PO FEVER;  Start 6/12/17 at 15:00


Ondansetron HCl (Zofran) 4 mg PRN Q6HRS  PRN IV NAUSEA/VOMITING Last 

administered on 6/13/17at 05:50;  Start 6/12/17 at 15:00


Morphine Sulfate 2 mg PRN Q2HR  PRN IV MODRATE PAIN;  Start 6/12/17 at 15:00


Tramadol HCl (Ultram) 50 mg PRN Q6HRS  PRN PO MILD PAIN;  Start 6/12/17 at 15:00


Hydralazine HCl (Apresoline) 10 mg PRN Q4HRS  PRN IVP ELEVATED BP, SEE COMMENTS

;  Start 6/12/17 at 15:00


Docusate Sodium (Colace) 100 mg PRN DAILY  PRN PO CONSTIPATION;  Start 6/12/17 

at 15:00


Sodium Chloride 1,000 ml @  75 mls/hr [id number] IV  Last administered on 6/12/ 17at 22:17;  Start 6/12/17 at 15:00


Midazolam HCl (Versed) 5 mg STK-MED ONCE .ROUTE ;  Start 6/12/17 at 16:55;  

Stop 6/12/17 at 16:56;  Status DC


Ringer's Solution 1,000 ml @  125 mls/hr Q8H IV  Last administered on 6/12/17at 

17:00;  Start 6/12/17 at 16:57;  Stop 6/13/17 at 04:56;  Status DC


Propofol 20 ml @ As Directed STK-MED ONCE IV ;  Start 6/12/17 at 16:59;  Stop 6/ 12/17 at 17:00;  Status DC


Lidocaine HCl (Lidocaine Pf 2% Vial) 5 ml STK-MED ONCE .ROUTE ;  Start 6/12/17 

at 16:59;  Stop 6/12/17 at 17:00;  Status DC


Hydromorphone HCl (Dilaudid) 1 mg 1X  ONCE IV  Last administered on 6/12/17at 18

:12;  Start 6/12/17 at 18:15;  Stop 6/12/17 at 18:16;  Status DC


Hydromorphone HCl (Dilaudid) 1 mg PRN Q3HRS  PRN IV SEVERE PAIN Last 

administered on 6/13/17at 05:50;  Start 6/12/17 at 20:15


Pantoprazole Sodium (Protonix) 40 mg BIDAC PO ;  Start 6/13/17 at 16:30





Active Scripts


Active


Oxycodone-Acetaminophen 5-325 (Oxycodone Hcl/Acetaminophen) 1 Each Tablet 2 Tab 

PO PRN Q4HRS PRN


Cyclobenzaprine Hcl 10 Mg Tablet 10 Mg PO PRN Q6HRS PRN 5 Days


Oxycontin (Oxycodone HCl) 15 Mg Tab.er.12h 20 Mg PO Q12HR


[Oxycodone Hcl/Acetaminophen] 1 TAB Tablet 1 Tab PO PRN Q4HRS PRN


Reported


Tramadol Hcl 50 Mg Tablet 1 Tab PO BID


Alprazolam 1 Mg Tablet 1 Mg PO PRN Q6HRS PRN


Lyrica (Pregabalin) 150 Mg Capsule 150 Mg PO HS 30 Days


Trazodone Hcl 300 Mg Tablet 425 Mg PO HS


Ambien (Zolpidem Tartrate) 10 Mg Tablet 10 Mg PO HS


Levothyroxine Sodium 125 Mcg Tablet 125 Mcg PO DAILYAC


Lyrica (Pregabalin) 300 Mg Capsule 300 Mg PO DAILY


Effexor Xr (Venlafaxine Hcl) 150 Mg Cap.er.24h 300 Mg PO DAILY


Lyrica (Pregabalin) 150 Mg Capsule 1 Cap PO BID


Ambien (Zolpidem Tartrate) 5 Mg Tablet 1 Tab PO QHS


Trazodone Hcl 300 Mg Tablet 1 Tab PO QHS


Levothyroxine Sodium 125 Mcg Tablet 1 Tab PO DAILY


Tegretol (Carbamazepine) 200 Mg Tablet 200 Mg PO 


Xanax (Alprazolam) 2 Mg Tablet 1 Tab PO TID


Vitals/I & O





Vital Sign - Last 24 Hours








 6/12/17 6/12/17 6/12/17 6/12/17





 15:00 16:01 17:15 17:30


 


Temp 97.5  98.0 





 97.5  98.0 


 


Pulse 73  73 77


 


Resp 18 18 12 20


 


B/P (MAP) 119/63 (81)  96/59 96/55


 


Pulse Ox 93 93 96 97


 


O2 Delivery Room Air Room Air Nasal Cannula Room Air


 


O2 Flow Rate   2 


 


    





    





 6/12/17 6/12/17 6/12/17 6/12/17





 17:45 18:00 18:12 18:15


 


Pulse 80 80  78


 


Resp 14 22 22 18


 


B/P (MAP) 105/74 111/90  98/60


 


Pulse Ox 96 97 98 97


 


O2 Delivery Nasal Cannula Nasal Cannula Nasal Cannula Nasal Cannula


 


O2 Flow Rate 2 2 2.0 2





 6/12/17 6/12/17 6/12/17 6/12/17





 18:30 18:45 19:01 19:32


 


Temp    98.0





    98.0


 


Pulse 72 72 72 72


 


Resp 20 20 14 


 


B/P (MAP) 103/33 102/46 99/72 99/72 (81)


 


Pulse Ox 96 96 94 94


 


O2 Delivery Nasal Cannula Nasal Cannula Nasal Cannula Nasal Cannula


 


O2 Flow Rate 2 2 2 2.0


 


    





    





 6/12/17 6/12/17 6/12/17 6/13/17





 20:00 20:44 23:00 00:23


 


Temp   97.7 





   97.7 


 


Pulse   82 


 


Resp  18 20 18


 


B/P (MAP)   134/73 (93) 


 


Pulse Ox   88 


 


O2 Delivery Room Air Room Air  Room Air


 


    





    





 6/13/17 6/13/17 6/13/17 6/13/17





 05:45 05:50 07:00 07:50


 


Temp   97.8 





   97.8 


 


Pulse 109  109 


 


Resp 18 18 20 


 


B/P (MAP) 152/102 (119)  125/73 (90) 


 


Pulse Ox 91  93 


 


O2 Delivery  Room Air Room Air Room Air


 


O2 Flow Rate    2.0


 


    





    





 6/13/17 6/13/17 6/13/17 





 08:43 10:59 11:00 


 


Temp   98.6 





   98.6 


 


Pulse   96 


 


Resp 18 18 18 


 


B/P (MAP)   115/79 (91) 


 


Pulse Ox   88 


 


O2 Delivery Nasal Cannula Nasal Cannula Room Air 


 


O2 Flow Rate 2.0 2.0  














Intake and Output   


 


 6/12/17 6/12/17 6/13/17





 15:00 23:00 07:00


 


Intake Total  970 ml 825 ml


 


Balance  970 ml 825 ml

















IRIS JEFFREY MD Jun 13, 2017 12:55

## 2017-06-13 NOTE — PDOC
Subjective:


Subjective:


Pain is better, no bleeding, not hungry, maybe some nausea.





Objective:


Objective:


Per RN - no bleeding, sleepy today, still asks for Dilaudid.


Reviewed nursing notes.


Vital Signs:





 Vital Signs








  Date Time  Temp Pulse Resp B/P (MAP) Pulse Ox O2 Delivery O2 Flow Rate FiO2


 


6/13/17 11:00 98.6 96 18 115/79 (91) 88 Room Air  





 98.6       


 


6/13/17 10:59       2.0 








Labs:





Laboratory Tests








Test


  6/13/17


09:00


 


White Blood Count 7.8 x10^3/uL 


 


Red Blood Count 4.32 x10^6/uL 


 


Hemoglobin 13.4 g/dL 


 


Hematocrit 38.7 % 


 


Mean Corpuscular Volume 90 fL 


 


Mean Corpuscular Hemoglobin 31 pg 


 


Mean Corpuscular Hemoglobin


Concent 35 g/dL 


 


 


Red Cell Distribution Width 15.3 % 


 


Platelet Count 182 x10^3/uL 


 


Neutrophils (%) (Auto) 79 % 


 


Lymphocytes (%) (Auto) 15 % 


 


Monocytes (%) (Auto) 6 % 


 


Eosinophils (%) (Auto) 0 % 


 


Basophils (%) (Auto) 0 % 


 


Neutrophils # (Auto) 6.2 x10^3uL 


 


Lymphocytes # (Auto) 1.1 x10^3/uL 


 


Monocytes # (Auto) 0.4 x10^3/uL 


 


Eosinophils # (Auto) 0.0 x10^3/uL 


 


Basophils # (Auto) 0.0 x10^3/uL 


 


Sodium Level 139 mmol/L 


 


Potassium Level 4.6 mmol/L 


 


Chloride Level 103 mmol/L 


 


Carbon Dioxide Level 32 mmol/L 


 


Anion Gap 4 


 


Blood Urea Nitrogen 14 mg/dL 


 


Creatinine 0.6 mg/dL 


 


Estimated GFR


(Cockcroft-Gault) 103.4 


 


 


Glucose Level 115 mg/dL 


 


Calcium Level 8.6 mg/dL 








Imaging:


EGD 6/12/17: erosive gastritis.





PE:





GEN: NAD


LUNGS: clear


HEART: RRR


ABD: soft, non-tender


NEURO/PSYCH: drowsy





A/P:


Coffee-ground emesis - prior to admission, no recurrence


-Hgb remains WNL


-on PPI


-erosive gastritis on EGD





Abd pain - better





--


Drowsy today.


Continue PPI (change from IV to PO), diet as tolerated.











NAHUN REED Jun 13, 2017 12:26

## 2017-06-14 VITALS — SYSTOLIC BLOOD PRESSURE: 126 MMHG | DIASTOLIC BLOOD PRESSURE: 70 MMHG

## 2017-06-14 VITALS — SYSTOLIC BLOOD PRESSURE: 122 MMHG | DIASTOLIC BLOOD PRESSURE: 79 MMHG

## 2017-06-14 VITALS — SYSTOLIC BLOOD PRESSURE: 161 MMHG | DIASTOLIC BLOOD PRESSURE: 77 MMHG

## 2017-06-14 LAB
ANION GAP SERPL CALC-SCNC: 3 MMOL/L (ref 6–14)
BASOPHILS # BLD AUTO: 0 X10^3/UL (ref 0–0.2)
BASOPHILS # BLD AUTO: 0 X10^3/UL (ref 0–0.2)
BASOPHILS NFR BLD: 0 % (ref 0–3)
BASOPHILS NFR BLD: 0 % (ref 0–3)
BUN SERPL-MCNC: 12 MG/DL (ref 7–20)
CALCIUM SERPL-MCNC: 8.4 MG/DL (ref 8.5–10.1)
CHLORIDE SERPL-SCNC: 102 MMOL/L (ref 98–107)
CO2 SERPL-SCNC: 33 MMOL/L (ref 21–32)
CREAT SERPL-MCNC: 0.6 MG/DL (ref 0.6–1)
EOSINOPHIL NFR BLD: 0 % (ref 0–3)
EOSINOPHIL NFR BLD: 0 % (ref 0–3)
ERYTHROCYTE [DISTWIDTH] IN BLOOD BY AUTOMATED COUNT: 14.9 % (ref 11.5–14.5)
ERYTHROCYTE [DISTWIDTH] IN BLOOD BY AUTOMATED COUNT: 15.3 % (ref 11.5–14.5)
GFR SERPLBLD BASED ON 1.73 SQ M-ARVRAT: 103 ML/MIN
GLUCOSE SERPL-MCNC: 118 MG/DL (ref 70–99)
HCT VFR BLD CALC: 36.8 % (ref 36–47)
HCT VFR BLD CALC: 39.2 % (ref 36–47)
HGB BLD-MCNC: 12 G/DL (ref 12–15.5)
HGB BLD-MCNC: 12.8 G/DL (ref 12–15.5)
LYMPHOCYTES # BLD: 1.2 X10^3/UL (ref 1–4.8)
LYMPHOCYTES # BLD: 2.7 X10^3/UL (ref 1–4.8)
LYMPHOCYTES NFR BLD AUTO: 15 % (ref 24–48)
LYMPHOCYTES NFR BLD AUTO: 29 % (ref 24–48)
MCH RBC QN AUTO: 30 PG (ref 25–35)
MCH RBC QN AUTO: 30 PG (ref 25–35)
MCHC RBC AUTO-ENTMCNC: 33 G/DL (ref 31–37)
MCHC RBC AUTO-ENTMCNC: 33 G/DL (ref 31–37)
MCV RBC AUTO: 92 FL (ref 79–100)
MCV RBC AUTO: 93 FL (ref 79–100)
MONOCYTES NFR BLD: 6 % (ref 0–9)
MONOCYTES NFR BLD: 9 % (ref 0–9)
NEUTROPHILS NFR BLD AUTO: 62 % (ref 31–73)
NEUTROPHILS NFR BLD AUTO: 78 % (ref 31–73)
PLATELET # BLD AUTO: 162 X10^3/UL (ref 140–400)
PLATELET # BLD AUTO: 208 X10^3/UL (ref 140–400)
POTASSIUM SERPL-SCNC: 4.2 MMOL/L (ref 3.5–5.1)
RBC # BLD AUTO: 3.99 X10^6/UL (ref 3.5–5.4)
RBC # BLD AUTO: 4.22 X10^6/UL (ref 3.5–5.4)
SODIUM SERPL-SCNC: 138 MMOL/L (ref 136–145)
WBC # BLD AUTO: 7.9 X10^3/UL (ref 4–11)
WBC # BLD AUTO: 9.5 X10^3/UL (ref 4–11)

## 2017-06-14 RX ADMIN — HYDROMORPHONE HYDROCHLORIDE PRN MG: 2 INJECTION INTRAMUSCULAR; INTRAVENOUS; SUBCUTANEOUS at 09:07

## 2017-06-14 RX ADMIN — PREGABALIN SCH MG: 75 CAPSULE ORAL at 09:04

## 2017-06-14 RX ADMIN — VENLAFAXINE SCH MG: 50 TABLET ORAL at 14:29

## 2017-06-14 RX ADMIN — BACITRACIN SCH MLS/HR: 5000 INJECTION, POWDER, FOR SOLUTION INTRAMUSCULAR at 06:40

## 2017-06-14 RX ADMIN — VENLAFAXINE SCH MG: 50 TABLET ORAL at 09:20

## 2017-06-14 RX ADMIN — HYDROMORPHONE HYDROCHLORIDE PRN MG: 2 INJECTION INTRAMUSCULAR; INTRAVENOUS; SUBCUTANEOUS at 05:20

## 2017-06-14 RX ADMIN — PANTOPRAZOLE SODIUM SCH MG: 40 TABLET, DELAYED RELEASE ORAL at 17:01

## 2017-06-14 RX ADMIN — PANTOPRAZOLE SODIUM SCH MG: 40 TABLET, DELAYED RELEASE ORAL at 09:04

## 2017-06-14 RX ADMIN — ACETAMINOPHEN PRN MG: 325 TABLET, FILM COATED ORAL at 17:01

## 2017-06-14 RX ADMIN — METOCLOPRAMIDE PRN MG: 5 INJECTION, SOLUTION INTRAMUSCULAR; INTRAVENOUS at 17:02

## 2017-06-14 RX ADMIN — LEVOTHYROXINE SODIUM SCH MCG: 125 TABLET ORAL at 05:17

## 2017-06-14 RX ADMIN — HYDROMORPHONE HYDROCHLORIDE PRN MG: 2 INJECTION INTRAMUSCULAR; INTRAVENOUS; SUBCUTANEOUS at 12:02

## 2017-06-14 RX ADMIN — ACETAMINOPHEN PRN MG: 325 TABLET, FILM COATED ORAL at 09:20

## 2017-06-14 RX ADMIN — METOCLOPRAMIDE PRN MG: 5 INJECTION, SOLUTION INTRAMUSCULAR; INTRAVENOUS at 09:06

## 2017-06-14 NOTE — PDOC
Subjective:


Subjective:


I saw her earlier this morning.


Headache, not hungry.


No bleeding or abd pain.





Objective:


Objective:


RN called earlier.  Possible DC today if okay w/ us.


Vital Signs:





 Vital Signs








  Date Time  Temp Pulse Resp B/P (MAP) Pulse Ox O2 Delivery O2 Flow Rate FiO2


 


6/14/17 09:07     94 Room Air 2.0 


 


6/14/17 07:00 98.7 98 18 161/77 (105)    





 98.7       








Labs:





Laboratory Tests








Test


  6/14/17


08:50


 


White Blood Count 7.9 x10^3/uL 


 


Red Blood Count 3.99 x10^6/uL 


 


Hemoglobin 12.0 g/dL 


 


Hematocrit 36.8 % 


 


Mean Corpuscular Volume 92 fL 


 


Mean Corpuscular Hemoglobin 30 pg 


 


Mean Corpuscular Hemoglobin


Concent 33 g/dL 


 


 


Red Cell Distribution Width 14.9 % 


 


Platelet Count 162 x10^3/uL 


 


Neutrophils (%) (Auto) 78 % 


 


Lymphocytes (%) (Auto) 15 % 


 


Monocytes (%) (Auto) 6 % 


 


Eosinophils (%) (Auto) 0 % 


 


Basophils (%) (Auto) 0 % 


 


Neutrophils # (Auto) 6.2 x10^3uL 


 


Lymphocytes # (Auto) 1.2 x10^3/uL 


 


Monocytes # (Auto) 0.5 x10^3/uL 


 


Eosinophils # (Auto) 0.0 x10^3/uL 


 


Basophils # (Auto) 0.0 x10^3/uL 


 


Sodium Level 138 mmol/L 


 


Potassium Level 4.2 mmol/L 


 


Chloride Level 102 mmol/L 


 


Carbon Dioxide Level 33 mmol/L 


 


Anion Gap 3 


 


Blood Urea Nitrogen 12 mg/dL 


 


Creatinine 0.6 mg/dL 


 


Estimated GFR


(Cockcroft-Gault) 103.0 


 


 


Glucose Level 118 mg/dL 


 


Calcium Level 8.4 mg/dL 











PE:





GEN: hold head


LUNGS: CTAB


HEART: RRR


ABD: S/ND/NT


NEURO/PSYCH: drowsy





A/P:


Coffee-ground emesis - prior to admission, no recurrence


-Hgb still WNL


-on PPI BID


-erosive gastritis on EGD





Abd pain - better





Decreased appetite





Headache


-per primary





--


Encouraged diet.


Continue PPI.


DC per primary.








*****


UPDATE: Paged by KELSEY Pratt at 0826, who already spoke w/ Dr. Parkinson. 


Pt reporting "vomited blood all over herself and the nurse saw" when told she 

had discharge orders.


No vomiting or bleeding witnessed by staff.


RN rechecked CBC - Hgb increased from 12 to 12.8.


Still okay to DC per GI.











NAHUN REED Jun 14, 2017 10:50

## 2017-06-14 NOTE — PDOC3
Discharge Summary [facility]


Date of Admission:  Jun 12, 2017


Discharge Date:  Jun 14, 2017


Admitting Diagnosis


1. coffee ground emesis with erosive gastritis on EGD


2. chronic lower back pain, 2/2 OA, or muscle spasm


3 bipolar disorder, 1


4. h/o lupus, no meds


5. likely pain meds seeker


6. obesity


7. hypothyroidism


Problems:  


Final Diagnosis





CONSULTS


gi


Procedures


EGD showed erosive gastritis, no active bleeding


Brief Hospital Course


Patient is a 56  year old female presenting to the emergency department for 

evaluation of diffuse abdominal pain worse in her epigastrium and associated 

with coffee-ground emesis 2 episodes this morning.  


pt was Just DCed from here 2 weeks ago for chest pain with neg MPI, chronic 

back pain.


pain meds seeker.


Pt said she was taking prednisone 80mg daily in the past 2 weeks for pain 

control in [place]. then started to have abd pain yesterday, with coffee 

ground emesis x2 this am. The pain is mainly upper ABD, tenderness, no radiating

, 10/10. 


Pt very anxious, requires dilaudid and ativan iv now. Hb stable, CT abd neg. 


She said she had h/o gastric ulcer 2 years ago.





EGD showed erosive gastritis. Hb always normal


pt still c/o abd pain, eats ok


dc home. percocet 5/325 10 pills, protonix 40mg bid. 


dc time 40min








General:  Alert, Oriented X3, Cooperative


Heart:  Regular rate, Normal S1, Normal S2


Lungs:  Clear


Abdomen:  Normal bowel sounds, Soft, Other (epigastric area mild tenderness)


Extremities:  No clubbing, No cyanosis


Skin:  No rashes


Problems:  


Disposition


home


CONDITION AT DISCHARGE:  Improved


Diet


gi soft


Scheduled


Alprazolam (Xanax), 1 TAB PO TID, (Reported)


Levothyroxine Sodium (Levothyroxine Sodium), 1 TAB PO DAILY, (Reported)


Pantoprazole Sodium (Pantoprazole Sodium), 40 MG PO BIDAC


Pregabalin (Lyrica), 1 CAP PO BID, (Reported)


Tramadol Hcl (Tramadol Hcl), 1 TAB PO BID, (Reported)


Trazodone Hcl (Trazodone Hcl), 1 TAB PO QHS, (Reported)


Venlafaxine Hcl (Effexor Xr), 300 MG PO DAILY, (Reported)


Zolpidem Tartrate (Ambien), 1 TAB PO QHS, (Reported)





Scheduled PRN


Alprazolam (Alprazolam), 1 MG PO PRN Q6HRS PRN for ANXIETY / AGITATION, (

Reported)


Cyclobenzaprine Hcl (Cyclobenzaprine Hcl), 10 MG PO PRN Q6HRS PRN for MUSCLE 

SPASMS


Oxycodone Hcl/Acetaminophen (Oxycodone-Acetaminophen 5-325), 2 TAB PO PRN Q4HRS 

PRN for MODERATE TO SEVERE PAIN


[Oxycodone Hcl/Acetaminophen], 1 TAB PO PRN Q4HRS PRN for MODERATE PAIN





Miscellaneous Medications


Carbamazepine (Tegretol), 200 MG PO, (Reported)





Discontinued Medications


Levothyroxine Sodium (Levothyroxine Sodium), 125 MCG PO DAILYAC, (Reported)


Oxycodone Hcl (Oxycontin), 20 MG PO Q12HR


Pregabalin (Lyrica), 300 MG PO DAILY, (Reported)


Pregabalin (Lyrica), 150 MG PO HS, (Reported)


Trazodone Hcl (Trazodone Hcl), 425 MG PO HS, (Reported)


Zolpidem Tartrate (Ambien), 10 MG PO HS, (Reported)


Follow Up


pcp in 2 weeks











IRIS JEFFREY MD Jun 14, 2017 11:45

## 2017-06-20 ENCOUNTER — HOSPITAL ENCOUNTER (EMERGENCY)
Dept: HOSPITAL 61 - ER | Age: 57
LOS: 1 days | Discharge: HOME | End: 2017-06-21
Payer: MEDICARE

## 2017-06-20 VITALS — WEIGHT: 220 LBS | BODY MASS INDEX: 32.58 KG/M2 | HEIGHT: 69 IN

## 2017-06-20 DIAGNOSIS — Z90.710: ICD-10-CM

## 2017-06-20 DIAGNOSIS — F32.9: ICD-10-CM

## 2017-06-20 DIAGNOSIS — Z87.11: ICD-10-CM

## 2017-06-20 DIAGNOSIS — Z98.82: ICD-10-CM

## 2017-06-20 DIAGNOSIS — Z88.8: ICD-10-CM

## 2017-06-20 DIAGNOSIS — R19.7: ICD-10-CM

## 2017-06-20 DIAGNOSIS — R10.10: Primary | ICD-10-CM

## 2017-06-20 DIAGNOSIS — Z90.49: ICD-10-CM

## 2017-06-20 DIAGNOSIS — R11.10: ICD-10-CM

## 2017-06-20 DIAGNOSIS — R10.816: ICD-10-CM

## 2017-06-20 LAB
ALBUMIN SERPL-MCNC: 3.5 G/DL (ref 3.4–5)
ALBUMIN/GLOB SERPL: 1.1 {RATIO} (ref 1–1.7)
ALP SERPL-CCNC: 84 U/L (ref 46–116)
ALT SERPL-CCNC: 23 U/L (ref 14–59)
ANION GAP SERPL CALC-SCNC: 3 MMOL/L (ref 6–14)
AST SERPL-CCNC: 16 U/L (ref 15–37)
BASOPHILS # BLD AUTO: 0 X10^3/UL (ref 0–0.2)
BASOPHILS NFR BLD: 1 % (ref 0–3)
BILIRUB SERPL-MCNC: 0.2 MG/DL (ref 0.2–1)
BUN SERPL-MCNC: 10 MG/DL (ref 7–20)
BUN/CREAT SERPL: 13 (ref 6–20)
CALCIUM SERPL-MCNC: 9 MG/DL (ref 8.5–10.1)
CHLORIDE SERPL-SCNC: 105 MMOL/L (ref 98–107)
CO2 SERPL-SCNC: 32 MMOL/L (ref 21–32)
CREAT SERPL-MCNC: 0.8 MG/DL (ref 0.6–1)
EOSINOPHIL NFR BLD: 2 % (ref 0–3)
ERYTHROCYTE [DISTWIDTH] IN BLOOD BY AUTOMATED COUNT: 15.4 % (ref 11.5–14.5)
GFR SERPLBLD BASED ON 1.73 SQ M-ARVRAT: 73.9 ML/MIN
GLOBULIN SER-MCNC: 3.3 G/DL (ref 2.2–3.8)
GLUCOSE SERPL-MCNC: 102 MG/DL (ref 70–99)
HCT VFR BLD CALC: 37.3 % (ref 36–47)
HGB BLD-MCNC: 12.4 G/DL (ref 12–15.5)
INR PPP: 1 (ref 0.8–1.1)
LYMPHOCYTES # BLD: 2.4 X10^3/UL (ref 1–4.8)
LYMPHOCYTES NFR BLD AUTO: 36 % (ref 24–48)
MCH RBC QN AUTO: 30 PG (ref 25–35)
MCHC RBC AUTO-ENTMCNC: 33 G/DL (ref 31–37)
MCV RBC AUTO: 91 FL (ref 79–100)
MONOCYTES NFR BLD: 10 % (ref 0–9)
NEG OBC FOB: (no result)
NEUTROPHILS NFR BLD AUTO: 51 % (ref 31–73)
PLATELET # BLD AUTO: 189 X10^3/UL (ref 140–400)
POS OBC FOB: (no result)
POTASSIUM SERPL-SCNC: 3.6 MMOL/L (ref 3.5–5.1)
PROT SERPL-MCNC: 6.8 G/DL (ref 6.4–8.2)
PROTHROMBIN TIME: 12.4 SEC (ref 11.7–14)
RBC # BLD AUTO: 4.08 X10^6/UL (ref 3.5–5.4)
SODIUM SERPL-SCNC: 140 MMOL/L (ref 136–145)
WBC # BLD AUTO: 6.5 X10^3/UL (ref 4–11)

## 2017-06-20 PROCEDURE — 82274 ASSAY TEST FOR BLOOD FECAL: CPT

## 2017-06-20 PROCEDURE — 83690 ASSAY OF LIPASE: CPT

## 2017-06-20 PROCEDURE — 96375 TX/PRO/DX INJ NEW DRUG ADDON: CPT

## 2017-06-20 PROCEDURE — 96361 HYDRATE IV INFUSION ADD-ON: CPT

## 2017-06-20 PROCEDURE — 96374 THER/PROPH/DIAG INJ IV PUSH: CPT

## 2017-06-20 PROCEDURE — 85027 COMPLETE CBC AUTOMATED: CPT

## 2017-06-20 PROCEDURE — 99285 EMERGENCY DEPT VISIT HI MDM: CPT

## 2017-06-20 PROCEDURE — 85610 PROTHROMBIN TIME: CPT

## 2017-06-20 PROCEDURE — 74177 CT ABD & PELVIS W/CONTRAST: CPT

## 2017-06-20 PROCEDURE — 96376 TX/PRO/DX INJ SAME DRUG ADON: CPT

## 2017-06-20 PROCEDURE — 36415 COLL VENOUS BLD VENIPUNCTURE: CPT

## 2017-06-20 PROCEDURE — 80053 COMPREHEN METABOLIC PANEL: CPT

## 2017-06-20 NOTE — PHYS DOC
Past Medical History


Past Medical History:  Depression, P.U.D., Other


Additional Past Medical Histor:  Lupus


Past Surgical History:  Appendectomy, Cholecystectomy, , Hysterectomy, 

Other


Additional Past Surgical Histo:  breast implants and removal, rt ankle


Alcohol Use:  Rarely


Drug Use:  None





Adult General


Chief Complaint


Chief Complaint:  GI PROBLEM





HPI


HPI





Patient is a 57  year old FEMALE who presents with ABDOMINAL PAIN, VOMITING AND 

DARK STOOLS.  Pt states recent admission and diagnosis of Ulcer in stomach by 

EGD,  was discharged on Protonix and zofran.  Pt states has never felt well 

since discharge.  Past 2 days vomiting that turned "darker" today.  Upper 

abdominal pain, fullness "like a boil"  currently 8/10.  Loose stools that 

looked dark today.  Pt states she is taking her Protonix and Zofran.





Review of Systems


Review of Systems





Constitutional: Denies fever or chills []


Eyes: Denies change in visual acuity, redness, or eye pain []


HENT: Denies nasal congestion or sore throat []


Respiratory: Denies cough or shortness of breath []


Cardiovascular: No additional information not addressed in HPI []


GI: yes, abdominal pain, vomiting and diarrhea]


: Denies dysuria or hematuria []


Musculoskeletal: Denies back pain or joint pain []


Integument: Denies rash or skin lesions []


Neurologic: Denies headache, focal weakness or sensory changes []





Current Medications


Current Medications





Current Medications








 Medications


  (Trade)  Dose


 Ordered  Sig/[facility]  Start Time


 Stop Time Status Last Admin


Dose Admin


 


 Hydromorphone HCl


  (Dilaudid)  0.5 mg  1X  ONCE  17 01:00


 17 01:01  17 00:46


0.5 MG


 


 Iohexol


  (Omnipaque 300


 Mg/ml)  75 ml  1X  ONCE  17 00:30


 17 00:31 DC 17 00:27


75 ML


 


 Morphine Sulfate  4 mg  1X  ONCE  17 23:00


 17 23:01 DC 17 23:12


4 MG


 


 Ondansetron HCl


  (Zofran)  4 mg  1X  ONCE  17 23:00


 17 23:01 DC 17 23:13


4 MG


 


 Sodium Chloride  1,000 ml @ 


 1,000 mls/hr  1X  ONCE  17 23:00


 17 23:59 DC 17 23:13


1,000 MLS/HR











Allergies


Allergies





Allergies








Coded Allergies Type Severity Reaction Last Updated Verified


 


  gabapentin Allergy Intermediate  17 Yes


 


  NSAIDS (Non-Steroidal Anti-Inflamma Allergy Mild Nausea and Vomiting 17 

Yes











Physical Exam


Physical Exam





Constitutional: Well developed, well nourished, no acute distress, non-toxic 

appearance. []


HENT: Normocephalic, atraumatic, bilateral external ears normal, oropharynx 

moist, no oral exudates, nose normal. []


Eyes: PERRL, EOMI, conjunctiva normal, no discharge. [] 


Neck: Normal range of motion, no tenderness, supple, no stridor. [] 


Cardiovascular:Heart rate regular rhythm, no murmur []


Lungs & Thorax:  Bilateral breath sounds clear to auscultation []


Abdomen: Bowel sounds normal, soft, epigastric tenderness to palpation, no 

guarding, no rebound


Rectal--stool was brown and guaic negative per lab


Skin: Warm, dry, no erythema, no rash. [] 


Back: No tenderness, no CVA tenderness. [] 


Extremities: No tenderness, no cyanosis, no clubbing, ROM intact, no edema. [] 


Neurologic: Alert and oriented X 3, normal motor function, normal sensory 

function, no focal deficits noted. []


Psychologic: Affect normal, judgement normal, mood normal. []





Current Patient Data


Vital Signs





 Vital Signs








  Date Time  Temp Pulse Resp B/P (MAP) Pulse Ox O2 Delivery O2 Flow Rate FiO2


 


17 22:30 98.7 57 18 124/91 (102) 97 Room Air  





 98.7       








Lab Values





 Laboratory Tests








Test


  17


23:00 17


23:45


 


White Blood Count


  6.5 x10^3/uL


(4.0-11.0) 


 


 


Red Blood Count


  4.08 x10^6/uL


(3.50-5.40) 


 


 


Hemoglobin


  12.4 g/dL


(12.0-15.5) 


 


 


Hematocrit


  37.3 %


(36.0-47.0) 


 


 


Mean Corpuscular Volume


  91 fL ()


  


 


 


Mean Corpuscular Hemoglobin 30 pg (25-35)   


 


Mean Corpuscular Hemoglobin


Concent 33 g/dL


(31-37) 


 


 


Red Cell Distribution Width


  15.4 %


(11.5-14.5)  H 


 


 


Platelet Count


  189 x10^3/uL


(140-400) 


 


 


Neutrophils (%) (Auto) 51 % (31-73)   


 


Lymphocytes (%) (Auto) 36 % (24-48)   


 


Monocytes (%) (Auto) 10 % (0-9)  H 


 


Eosinophils (%) (Auto) 2 % (0-3)   


 


Basophils (%) (Auto) 1 % (0-3)   


 


Neutrophils # (Auto)


  3.3 x10^3uL


(1.8-7.7) 


 


 


Lymphocytes # (Auto)


  2.4 x10^3/uL


(1.0-4.8) 


 


 


Monocytes # (Auto)


  0.7 x10^3/uL


(0.0-1.1) 


 


 


Eosinophils # (Auto)


  0.1 x10^3/uL


(0.0-0.7) 


 


 


Basophils # (Auto)


  0.0 x10^3/uL


(0.0-0.2) 


 


 


Prothrombin Time


  12.4 SEC


(11.7-14.0) 


 


 


Prothrombin Time INR 1.0 (0.8-1.1)   


 


Sodium Level


  140 mmol/L


(136-145) 


 


 


Potassium Level


  3.6 mmol/L


(3.5-5.1) 


 


 


Chloride Level


  105 mmol/L


() 


 


 


Carbon Dioxide Level


  32 mmol/L


(21-32) 


 


 


Anion Gap 3 (6-14)  L 


 


Blood Urea Nitrogen


  10 mg/dL


(7-20) 


 


 


Creatinine


  0.8 mg/dL


(0.6-1.0) 


 


 


Estimated GFR


(Cockcroft-Gault) 73.9  


  


 


 


BUN/Creatinine Ratio 13 (6-20)   


 


Glucose Level


  102 mg/dL


(70-99)  H 


 


 


Calcium Level


  9.0 mg/dL


(8.5-10.1) 


 


 


Total Bilirubin


  0.2 mg/dL


(0.2-1.0) 


 


 


Aspartate Amino Transferase


(AST) 16 U/L (15-37)


  


 


 


Alanine Aminotransferase (ALT)


  23 U/L (14-59)


  


 


 


Alkaline Phosphatase


  84 U/L


() 


 


 


Total Protein


  6.8 g/dL


(6.4-8.2) 


 


 


Albumin


  3.5 g/dL


(3.4-5.0) 


 


 


Albumin/Globulin Ratio 1.1 (1.0-1.7)   


 


Lipase


  80 U/L


() 


 


 


Stool Occult Blood


  


  Negative (NEG)


 





 Laboratory Tests


17 23:00








 Laboratory Tests


17 23:00











EKG


EKG


[]





Radiology/Procedures


Radiology/Procedures


[]ct ABD/PELVIS ----PENDING 56


Impressions:


ABDOMINAL PAIN





Course & Med Decision Making


Course & Med Decision Making


Pertinent Labs and Imaging studies reviewed. (See chart for details)





[]





Dragon Disclaimer


Dragon Disclaimer


This electronic medical record was generated, in whole or in part, using a 

voice recognition dictation system.





Departure


Departure


Referrals:  


NO PCP (PCP)











JOCELYNE ROBERTS MD 2017 22:45

## 2017-06-21 VITALS — DIASTOLIC BLOOD PRESSURE: 61 MMHG | SYSTOLIC BLOOD PRESSURE: 132 MMHG

## 2017-06-21 NOTE — RAD
INDICATION: GI BLEED LAST WEEK, STILL VOMITING, ABD PAIN, [id number] 75ML, 

PRIOR SENT

 

COMPARISON: June 12, 2017

 

TECHNIQUE: 

 

Axial CT images were obtained through the abdomen and pelvis with 

intravenous contrast.  

 

One or more of the following individualized dose reduction techniques were

utilized for this examination:  1. Automated exposure control;  2. 

Adjustment of the mA and/or kV according to patient size;  3. Use of 

iterative reconstruction technique.

 

FINDINGS:

 

Abdomen:

Chest Base: Partially imaged without gross abnormality.

Vessels: No abdominal aortic aneurysm.

Liver/Biliary: Postcholecystectomy.

Pancreas: No peripancreatic edema.

Spleen: Normal.

Kidneys/Adrenal: 25 mm cystic lesion left kidney. No hydronephrosis.

GI: Appendix not well seen. No dilated loops of bowel to suggest 

obstruction.

Contrast is seen within the left common femoral vein layering into the 

left iliac veins.

 

Pelvis:

Bladder: No definite adjacent inflammation.

Degenerative changes spine degenerative changes right hip.

 

IMPRESSION:

 

1. No evidence of bowel obstruction or hydronephrosis.

 

 

2. Cystic lesion left kidney.

 

 

3. Questionable region of nipple retraction on the right. This could be 

positional in nature but if there is physical exam finding of nipple 

retraction would consider obtaining a follow-up mammogram and/or 

ultrasound if one has not been obtained recently.

 

Electronically signed by: Morteza Quispe MD (6/21/2017 1:26 AM)

## 2017-06-24 ENCOUNTER — HOSPITAL ENCOUNTER (EMERGENCY)
Dept: HOSPITAL 61 - ER | Age: 57
Discharge: HOME | End: 2017-06-24
Payer: MEDICARE

## 2017-06-24 VITALS — SYSTOLIC BLOOD PRESSURE: 111 MMHG | DIASTOLIC BLOOD PRESSURE: 60 MMHG

## 2017-06-24 VITALS — HEIGHT: 69 IN | BODY MASS INDEX: 33.33 KG/M2 | WEIGHT: 225 LBS

## 2017-06-24 DIAGNOSIS — Z90.710: ICD-10-CM

## 2017-06-24 DIAGNOSIS — Z88.6: ICD-10-CM

## 2017-06-24 DIAGNOSIS — R11.2: ICD-10-CM

## 2017-06-24 DIAGNOSIS — M32.9: ICD-10-CM

## 2017-06-24 DIAGNOSIS — Z90.49: ICD-10-CM

## 2017-06-24 DIAGNOSIS — Z98.890: ICD-10-CM

## 2017-06-24 DIAGNOSIS — Z88.8: ICD-10-CM

## 2017-06-24 DIAGNOSIS — J45.909: ICD-10-CM

## 2017-06-24 DIAGNOSIS — R10.13: Primary | ICD-10-CM

## 2017-06-24 LAB
ALBUMIN SERPL-MCNC: 4 G/DL (ref 3.4–5)
ALP SERPL-CCNC: 94 U/L (ref 46–116)
ALT SERPL-CCNC: 23 U/L (ref 14–59)
ANION GAP SERPL CALC-SCNC: 6 MMOL/L (ref 6–14)
AST SERPL-CCNC: 17 U/L (ref 15–37)
BASOPHILS # BLD AUTO: 0 X10^3/UL (ref 0–0.2)
BASOPHILS NFR BLD: 1 % (ref 0–3)
BILIRUB DIRECT SERPL-MCNC: 0.1 MG/DL (ref 0–0.2)
BILIRUB SERPL-MCNC: 0.3 MG/DL (ref 0.2–1)
BUN SERPL-MCNC: 12 MG/DL (ref 7–20)
CALCIUM SERPL-MCNC: 9.5 MG/DL (ref 8.5–10.1)
CHLORIDE SERPL-SCNC: 103 MMOL/L (ref 98–107)
CO2 SERPL-SCNC: 33 MMOL/L (ref 21–32)
CREAT SERPL-MCNC: 0.8 MG/DL (ref 0.6–1)
EOSINOPHIL NFR BLD: 2 % (ref 0–3)
ERYTHROCYTE [DISTWIDTH] IN BLOOD BY AUTOMATED COUNT: 15.3 % (ref 11.5–14.5)
GFR SERPLBLD BASED ON 1.73 SQ M-ARVRAT: 73.9 ML/MIN
GLUCOSE SERPL-MCNC: 86 MG/DL (ref 70–99)
HCT VFR BLD CALC: 38.3 % (ref 36–47)
HGB BLD-MCNC: 12.7 G/DL (ref 12–15.5)
LYMPHOCYTES # BLD: 2.7 X10^3/UL (ref 1–4.8)
LYMPHOCYTES NFR BLD AUTO: 42 % (ref 24–48)
MCH RBC QN AUTO: 30 PG (ref 25–35)
MCHC RBC AUTO-ENTMCNC: 33 G/DL (ref 31–37)
MCV RBC AUTO: 91 FL (ref 79–100)
MONOCYTES NFR BLD: 9 % (ref 0–9)
NEG OBC FOB: (no result)
NEUTROPHILS NFR BLD AUTO: 46 % (ref 31–73)
PLATELET # BLD AUTO: 204 X10^3/UL (ref 140–400)
POS OBC FOB: (no result)
POTASSIUM SERPL-SCNC: 4 MMOL/L (ref 3.5–5.1)
PROT SERPL-MCNC: 7.1 G/DL (ref 6.4–8.2)
RBC # BLD AUTO: 4.2 X10^6/UL (ref 3.5–5.4)
SODIUM SERPL-SCNC: 142 MMOL/L (ref 136–145)
WBC # BLD AUTO: 6.4 X10^3/UL (ref 4–11)

## 2017-06-24 PROCEDURE — 99285 EMERGENCY DEPT VISIT HI MDM: CPT

## 2017-06-24 PROCEDURE — 80048 BASIC METABOLIC PNL TOTAL CA: CPT

## 2017-06-24 PROCEDURE — 96374 THER/PROPH/DIAG INJ IV PUSH: CPT

## 2017-06-24 PROCEDURE — 96375 TX/PRO/DX INJ NEW DRUG ADDON: CPT

## 2017-06-24 PROCEDURE — 36415 COLL VENOUS BLD VENIPUNCTURE: CPT

## 2017-06-24 PROCEDURE — 83690 ASSAY OF LIPASE: CPT

## 2017-06-24 PROCEDURE — 82274 ASSAY TEST FOR BLOOD FECAL: CPT

## 2017-06-24 PROCEDURE — S0028 INJECTION, FAMOTIDINE, 20 MG: HCPCS

## 2017-06-24 PROCEDURE — 80076 HEPATIC FUNCTION PANEL: CPT

## 2017-06-24 PROCEDURE — 96376 TX/PRO/DX INJ SAME DRUG ADON: CPT

## 2017-06-24 PROCEDURE — 85027 COMPLETE CBC AUTOMATED: CPT

## 2017-06-24 PROCEDURE — 96361 HYDRATE IV INFUSION ADD-ON: CPT

## 2017-06-24 NOTE — PHYS DOC
Past Medical History


Past Medical History:  Anxiety, Asthma, Depression, Pneumonia, P.U.D., Other


Additional Past Medical Histor:  Lupus; Erosive Gastritis


Past Surgical History:  Appendectomy, Cholecystectomy, , Hysterectomy, 

Other


Additional Past Surgical Histo:  breast implants and removal, rt ankle


Alcohol Use:  Rarely


Drug Use:  None





Adult General


Chief Complaint


Chief Complaint:  HEMATEMESIS/VOMITING BLOOD





HPI


HPI





Patient is a 57  year old male with a history of erosive gastritis who presents 

with epigastric abdominal pain associated with nausea and vomiting that has 

restarted since recent admission for erosive gastritis. States she has had 

multiple episodes of nonbloody nonbilious emesis throughout the day. This 

evening, she has had 2 episodes of coffee-ground emesis. She denies fever or 

chills, chest pain, dyspnea, cough, diarrhea, bloody or dark stools, dysuria, 

back pain.  States she was in the hospital recently and had EGD here. She 

states compliance with medications.





Review of Systems


Review of Systems





Constitutional: Denies fever or chills []


Eyes: Denies change in visual acuity, redness, or eye pain []


HENT: Denies nasal congestion or sore throat []


Respiratory: Denies cough or shortness of breath []


Cardiovascular: No additional information not addressed in HPI []


GI: Denies bloody stools  []


: Denies dysuria or hematuria []


Musculoskeletal: Denies back pain or joint pain []


Integument: Denies rash or skin lesions []


Neurologic: Denies headache, focal weakness or sensory changes []


Endocrine: Denies polyuria or polydipsia []





Current Medications


Current Medications





Current Medications








 Medications


  (Trade)  Dose


 Ordered  Sig/[facility]  Start Time


 Stop Time Status Last Admin


Dose Admin


 


 Famotidine


  (Pepcid)  20 mg  1X  ONCE  17 02:30


 17 02:31 DC 17 02:34


20 MG


 


 Fentanyl Citrate


  (Fentanyl 2ml


 Vial)  50 mcg  PRN Q15MIN  PRN  17 02:30


 17 02:33 DC  


 


 


 Hydromorphone HCl


  (Dilaudid)  2 mg  STK-MED ONCE  17 02:40


 17 02:41 DC  


 


 


 Multi-Ingredient


 Mouthwash/Gargle


  (Gi Cocktail


 Single Dose)  15 ml  1X  ONCE  17 03:45


 17 03:46   


 


 


 Ondansetron HCl


  (Zofran)  4 mg  1X  ONCE  17 02:30


 17 02:31 DC  


 


 


 Sodium Chloride  1,000 ml @ 


 1,000 mls/hr  Q1H  17 02:30


 17 03:29 DC 17 02:34


1,000 MLS/HR











Allergies


Allergies





Allergies








Coded Allergies Type Severity Reaction Last Updated Verified


 


  gabapentin Allergy Intermediate  17 Yes


 


  NSAIDS (Non-Steroidal Anti-Inflamma Allergy Mild Nausea and Vomiting 17 

Yes











Physical Exam


Physical Exam





Constitutional: Well developed, well nourished, no acute distress, non-toxic 

appearance. []


HENT: Normocephalic, atraumatic, bilateral external ears normal, oropharynx 

moist, nose normal. []


Eyes: PERRLA, EOMI. [] 


Neck: Normal range of motion, supple. [] 


Cardiovascular:Heart rate regular rhythm []


Lungs & Thorax:  Bilateral breath sounds clear to auscultation []


Abdomen: Bowel sounds normal, soft, moderate epigastric tenderness, no guarding 

or rebound. [] 


Skin: Warm, dry, no erythema, no rash. [] 


Back: No tenderness, no CVA tenderness. [] 


Extremities: No tenderness, ROM intact, no edema. [] 


Neurologic: Alert and oriented X 3, normal motor function, normal sensory 

function, no focal deficits noted. []


Psychologic: Affect normal, judgement normal, mood normal. []





Current Patient Data


Vital Signs





 Vital Signs








  Date Time  Temp Pulse Resp B/P (MAP) Pulse Ox O2 Delivery O2 Flow Rate FiO2


 


17 02:44   16   Room Air  


 


17 01:38 97.8 78  148/85 (106) 96   





 97.8       








Lab Values





 Laboratory Tests








Test


  17


02:08 17


02:36


 


White Blood Count


  6.4 x10^3/uL


(4.0-11.0) 


 


 


Red Blood Count


  4.20 x10^6/uL


(3.50-5.40) 


 


 


Hemoglobin


  12.7 g/dL


(12.0-15.5) 


 


 


Hematocrit


  38.3 %


(36.0-47.0) 


 


 


Mean Corpuscular Volume


  91 fL ()


  


 


 


Mean Corpuscular Hemoglobin 30 pg (25-35)   


 


Mean Corpuscular Hemoglobin


Concent 33 g/dL


(31-37) 


 


 


Red Cell Distribution Width


  15.3 %


(11.5-14.5)  H 


 


 


Platelet Count


  204 x10^3/uL


(140-400) 


 


 


Neutrophils (%) (Auto) 46 % (31-73)   


 


Lymphocytes (%) (Auto) 42 % (24-48)   


 


Monocytes (%) (Auto) 9 % (0-9)   


 


Eosinophils (%) (Auto) 2 % (0-3)   


 


Basophils (%) (Auto) 1 % (0-3)   


 


Neutrophils # (Auto)


  2.9 x10^3uL


(1.8-7.7) 


 


 


Lymphocytes # (Auto)


  2.7 x10^3/uL


(1.0-4.8) 


 


 


Monocytes # (Auto)


  0.6 x10^3/uL


(0.0-1.1) 


 


 


Eosinophils # (Auto)


  0.1 x10^3/uL


(0.0-0.7) 


 


 


Basophils # (Auto)


  0.0 x10^3/uL


(0.0-0.2) 


 


 


Sodium Level


  142 mmol/L


(136-145) 


 


 


Potassium Level


  4.0 mmol/L


(3.5-5.1) 


 


 


Chloride Level


  103 mmol/L


() 


 


 


Carbon Dioxide Level


  33 mmol/L


(21-32)  H 


 


 


Anion Gap 6 (6-14)   


 


Blood Urea Nitrogen


  12 mg/dL


(7-20) 


 


 


Creatinine


  0.8 mg/dL


(0.6-1.0) 


 


 


Estimated GFR


(Cockcroft-Gault) 73.9  


  


 


 


Glucose Level


  86 mg/dL


(70-99) 


 


 


Calcium Level


  9.5 mg/dL


(8.5-10.1) 


 


 


Total Bilirubin


  0.3 mg/dL


(0.2-1.0) 


 


 


Direct Bilirubin


  0.1 mg/dL


(0.0-0.2) 


 


 


Aspartate Amino Transferase


(AST) 17 U/L (15-37)


  


 


 


Alanine Aminotransferase (ALT)


  23 U/L (14-59)


  


 


 


Alkaline Phosphatase


  94 U/L


() 


 


 


Total Protein


  7.1 g/dL


(6.4-8.2) 


 


 


Albumin


  4.0 g/dL


(3.4-5.0) 


 


 


Lipase


  106 U/L


() 


 


 


Stool Occult Blood


  


  Negative (NEG)


 





 Laboratory Tests


17 02:08








 Laboratory Tests


17 02:08














Course & Med Decision Making


Course & Med Decision Making


Pertinent Labs and Imaging studies reviewed. (See chart for details)





Laboratory evaluation is unremarkable. She has no episodes of emesis here. Pain 

is improved with medications. Discussed case with Dr. Crook, gastroenterology, 

who recommends sucralfate and close follow-up with GI clinic. Return 

precautions given. She understands plan.





Dragon Disclaimer


Dragon Disclaimer


This electronic medical record was generated, in whole or in part, using a 

voice recognition dictation system.





Departure


Departure


Impression:  


 Primary Impression:  


 Nausea & vomiting


 Additional Impression:  


 Epigastric abdominal pain


Disposition:   HOME, SELF-CARE


Condition:  STABLE


Referrals:  


NO PCP (PCP)


Patient Instructions:  Gastritis, Adult, Easy-to-Read





Additional Instructions:  


Continue your current medications. Take sucralfate to help with gastritis pain. 

Follow-up with Dr. Parkinson, GI clinic. Please call for appointment. Return for 

any concerns.


Scripts


Sucralfate (SUCRALFATE) 1 Gm Tablet


1 TAB PO TID, #90 TAB 0 Refills


   Prov: JOSH RENDON MD         17





Problem Qualifiers








 Primary Impression:  


 Nausea & vomiting


 Vomiting type:  unspecified  Vomiting Intractability:  non-intractable  

Qualified Codes:  R11.2 - Nausea with vomiting, unspecified








JOSH RENDON MD 2017 03:03

## 2017-08-15 ENCOUNTER — HOSPITAL ENCOUNTER (OUTPATIENT)
Dept: HOSPITAL 61 - ER | Age: 57
Setting detail: OBSERVATION
LOS: 1 days | Discharge: HOME | End: 2017-08-16
Attending: INTERNAL MEDICINE | Admitting: INTERNAL MEDICINE
Payer: MEDICARE

## 2017-08-15 VITALS — WEIGHT: 224.06 LBS | HEIGHT: 69 IN | BODY MASS INDEX: 33.19 KG/M2

## 2017-08-15 VITALS — SYSTOLIC BLOOD PRESSURE: 131 MMHG | DIASTOLIC BLOOD PRESSURE: 61 MMHG

## 2017-08-15 VITALS — SYSTOLIC BLOOD PRESSURE: 114 MMHG | DIASTOLIC BLOOD PRESSURE: 68 MMHG

## 2017-08-15 VITALS — SYSTOLIC BLOOD PRESSURE: 137 MMHG | DIASTOLIC BLOOD PRESSURE: 73 MMHG

## 2017-08-15 VITALS — DIASTOLIC BLOOD PRESSURE: 71 MMHG | SYSTOLIC BLOOD PRESSURE: 119 MMHG

## 2017-08-15 VITALS — DIASTOLIC BLOOD PRESSURE: 61 MMHG | SYSTOLIC BLOOD PRESSURE: 131 MMHG

## 2017-08-15 VITALS — DIASTOLIC BLOOD PRESSURE: 81 MMHG | SYSTOLIC BLOOD PRESSURE: 114 MMHG

## 2017-08-15 DIAGNOSIS — Z80.2: ICD-10-CM

## 2017-08-15 DIAGNOSIS — Z90.49: ICD-10-CM

## 2017-08-15 DIAGNOSIS — K29.01: Primary | ICD-10-CM

## 2017-08-15 DIAGNOSIS — G62.9: ICD-10-CM

## 2017-08-15 DIAGNOSIS — F41.9: ICD-10-CM

## 2017-08-15 DIAGNOSIS — Z80.3: ICD-10-CM

## 2017-08-15 DIAGNOSIS — E05.90: ICD-10-CM

## 2017-08-15 DIAGNOSIS — F09: ICD-10-CM

## 2017-08-15 DIAGNOSIS — G89.29: ICD-10-CM

## 2017-08-15 DIAGNOSIS — J45.909: ICD-10-CM

## 2017-08-15 DIAGNOSIS — Z98.82: ICD-10-CM

## 2017-08-15 DIAGNOSIS — M19.90: ICD-10-CM

## 2017-08-15 DIAGNOSIS — Z82.49: ICD-10-CM

## 2017-08-15 DIAGNOSIS — F11.20: ICD-10-CM

## 2017-08-15 DIAGNOSIS — G47.33: ICD-10-CM

## 2017-08-15 DIAGNOSIS — M32.9: ICD-10-CM

## 2017-08-15 DIAGNOSIS — Z87.11: ICD-10-CM

## 2017-08-15 DIAGNOSIS — Z91.19: ICD-10-CM

## 2017-08-15 DIAGNOSIS — Z80.0: ICD-10-CM

## 2017-08-15 DIAGNOSIS — E03.9: ICD-10-CM

## 2017-08-15 DIAGNOSIS — Q61.9: ICD-10-CM

## 2017-08-15 DIAGNOSIS — F32.9: ICD-10-CM

## 2017-08-15 DIAGNOSIS — D64.9: ICD-10-CM

## 2017-08-15 LAB
ALBUMIN SERPL-MCNC: 3.3 G/DL (ref 3.4–5)
ALP SERPL-CCNC: 97 U/L (ref 46–116)
ALT SERPL-CCNC: 17 U/L (ref 14–59)
ANION GAP SERPL CALC-SCNC: 9 MMOL/L (ref 6–14)
APTT BLD: 30 SEC (ref 24–38)
AST SERPL-CCNC: 10 U/L (ref 15–37)
BASOPHILS # BLD AUTO: 0 X10^3/UL (ref 0–0.2)
BASOPHILS NFR BLD: 1 % (ref 0–3)
BILIRUB DIRECT SERPL-MCNC: < 0.1 MG/DL (ref 0–0.2)
BILIRUB SERPL-MCNC: 0.1 MG/DL (ref 0.2–1)
BUN SERPL-MCNC: 25 MG/DL (ref 7–20)
CALCIUM SERPL-MCNC: 8.7 MG/DL (ref 8.5–10.1)
CHLORIDE SERPL-SCNC: 105 MMOL/L (ref 98–107)
CO2 SERPL-SCNC: 28 MMOL/L (ref 21–32)
CREAT SERPL-MCNC: 0.7 MG/DL (ref 0.6–1)
EOSINOPHIL NFR BLD: 0 % (ref 0–3)
ERYTHROCYTE [DISTWIDTH] IN BLOOD BY AUTOMATED COUNT: 14.5 % (ref 11.5–14.5)
GFR SERPLBLD BASED ON 1.73 SQ M-ARVRAT: 86.2 ML/MIN
GLUCOSE SERPL-MCNC: 100 MG/DL (ref 70–99)
HCT VFR BLD CALC: 32.6 % (ref 36–47)
HGB BLD-MCNC: 10.9 G/DL (ref 12–15.5)
INR PPP: 1 (ref 0.8–1.1)
LYMPHOCYTES # BLD: 2.5 X10^3/UL (ref 1–4.8)
LYMPHOCYTES NFR BLD AUTO: 30 % (ref 24–48)
MCH RBC QN AUTO: 31 PG (ref 25–35)
MCHC RBC AUTO-ENTMCNC: 34 G/DL (ref 31–37)
MCV RBC AUTO: 91 FL (ref 79–100)
MONOCYTES NFR BLD: 8 % (ref 0–9)
NEG OBC FOB: (no result)
NEUTROPHILS NFR BLD AUTO: 61 % (ref 31–73)
PLATELET # BLD AUTO: 216 X10^3/UL (ref 140–400)
POS OBC FOB: (no result)
POTASSIUM SERPL-SCNC: 3.9 MMOL/L (ref 3.5–5.1)
PROT SERPL-MCNC: 6.8 G/DL (ref 6.4–8.2)
PROTHROMBIN TIME: 12.5 SEC (ref 11.7–14)
RBC # BLD AUTO: 3.58 X10^6/UL (ref 3.5–5.4)
SODIUM SERPL-SCNC: 142 MMOL/L (ref 136–145)
WBC # BLD AUTO: 8.2 X10^3/UL (ref 4–11)

## 2017-08-15 PROCEDURE — 85014 HEMATOCRIT: CPT

## 2017-08-15 PROCEDURE — 96376 TX/PRO/DX INJ SAME DRUG ADON: CPT

## 2017-08-15 PROCEDURE — 99285 EMERGENCY DEPT VISIT HI MDM: CPT

## 2017-08-15 PROCEDURE — 83690 ASSAY OF LIPASE: CPT

## 2017-08-15 PROCEDURE — 85025 COMPLETE CBC W/AUTO DIFF WBC: CPT

## 2017-08-15 PROCEDURE — 96375 TX/PRO/DX INJ NEW DRUG ADDON: CPT

## 2017-08-15 PROCEDURE — 85610 PROTHROMBIN TIME: CPT

## 2017-08-15 PROCEDURE — 82274 ASSAY TEST FOR BLOOD FECAL: CPT

## 2017-08-15 PROCEDURE — G0378 HOSPITAL OBSERVATION PER HR: HCPCS

## 2017-08-15 PROCEDURE — 84443 ASSAY THYROID STIM HORMONE: CPT

## 2017-08-15 PROCEDURE — G0379 DIRECT REFER HOSPITAL OBSERV: HCPCS

## 2017-08-15 PROCEDURE — 80076 HEPATIC FUNCTION PANEL: CPT

## 2017-08-15 PROCEDURE — 78264 GASTRIC EMPTYING IMG STUDY: CPT

## 2017-08-15 PROCEDURE — 85651 RBC SED RATE NONAUTOMATED: CPT

## 2017-08-15 PROCEDURE — 95816 EEG AWAKE AND DROWSY: CPT

## 2017-08-15 PROCEDURE — 85730 THROMBOPLASTIN TIME PARTIAL: CPT

## 2017-08-15 PROCEDURE — A9541 TC99M SULFUR COLLOID: HCPCS

## 2017-08-15 PROCEDURE — 96374 THER/PROPH/DIAG INJ IV PUSH: CPT

## 2017-08-15 PROCEDURE — 96361 HYDRATE IV INFUSION ADD-ON: CPT

## 2017-08-15 PROCEDURE — 85018 HEMOGLOBIN: CPT

## 2017-08-15 PROCEDURE — 36415 COLL VENOUS BLD VENIPUNCTURE: CPT

## 2017-08-15 PROCEDURE — 80048 BASIC METABOLIC PNL TOTAL CA: CPT

## 2017-08-15 RX ADMIN — LEVOTHYROXINE SODIUM SCH MCG: 125 TABLET ORAL at 10:21

## 2017-08-15 RX ADMIN — MORPHINE SULFATE PRN MG: 4 INJECTION, SOLUTION INTRAMUSCULAR; INTRAVENOUS at 19:40

## 2017-08-15 RX ADMIN — MORPHINE SULFATE PRN MG: 4 INJECTION, SOLUTION INTRAMUSCULAR; INTRAVENOUS at 22:07

## 2017-08-15 RX ADMIN — MORPHINE SULFATE PRN MG: 4 INJECTION, SOLUTION INTRAMUSCULAR; INTRAVENOUS at 12:26

## 2017-08-15 RX ADMIN — MORPHINE SULFATE PRN MG: 4 INJECTION, SOLUTION INTRAMUSCULAR; INTRAVENOUS at 08:21

## 2017-08-15 RX ADMIN — MORPHINE SULFATE PRN MG: 4 INJECTION, SOLUTION INTRAMUSCULAR; INTRAVENOUS at 14:42

## 2017-08-15 RX ADMIN — VENLAFAXINE HYDROCHLORIDE SCH MG: 75 TABLET ORAL at 17:05

## 2017-08-15 RX ADMIN — PREGABALIN SCH MG: 75 CAPSULE ORAL at 22:08

## 2017-08-15 RX ADMIN — MORPHINE SULFATE PRN MG: 4 INJECTION, SOLUTION INTRAMUSCULAR; INTRAVENOUS at 10:23

## 2017-08-15 RX ADMIN — VENLAFAXINE HYDROCHLORIDE SCH MG: 75 TABLET ORAL at 22:07

## 2017-08-15 RX ADMIN — PREGABALIN SCH MG: 75 CAPSULE ORAL at 10:21

## 2017-08-15 RX ADMIN — MORPHINE SULFATE PRN MG: 4 INJECTION, SOLUTION INTRAMUSCULAR; INTRAVENOUS at 17:06

## 2017-08-15 RX ADMIN — SUCRALFATE SCH GM: 1 TABLET ORAL at 10:26

## 2017-08-15 RX ADMIN — PANTOPRAZOLE SODIUM SCH MG: 40 TABLET, DELAYED RELEASE ORAL at 10:21

## 2017-08-15 RX ADMIN — VENLAFAXINE HYDROCHLORIDE SCH MG: 75 TABLET ORAL at 12:30

## 2017-08-15 RX ADMIN — SUCRALFATE SCH GM: 1 TABLET ORAL at 17:05

## 2017-08-15 RX ADMIN — PANTOPRAZOLE SODIUM SCH MG: 40 TABLET, DELAYED RELEASE ORAL at 17:05

## 2017-08-15 RX ADMIN — VENLAFAXINE HYDROCHLORIDE SCH MG: 75 TABLET ORAL at 10:21

## 2017-08-15 NOTE — PHYS DOC
Past Medical History


Past Medical History:  Anxiety, Asthma, Depression, Pneumonia, P.U.D., Other


Additional Past Medical Histor:  Lupus; Erosive Gastritis


Past Surgical History:  Appendectomy, Cholecystectomy, , Hysterectomy, 

Other


Additional Past Surgical Histo:  breast implants and removal, rt ankle


Alcohol Use:  None


Drug Use:  None





Adult General


Chief Complaint


Chief Complaint:  HEMATEMESIS/VOMITING BLOOD





HPI


HPI





Patient is a 57  year old female who presents with vomiting "coffee ground 

emesis". 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 

She states she has lupus and takes chronic nonsteroidal anti-inflammatory 

medications for the pain. She denies any blood in her stools. She has some 

epigastric abdominal pain. Feels slightly dizzy. She was here 2017 

with erosive gastritis and was discharged home to the emergency department. 

Hemoglobin at that time was 12.7 and hematocrit 38.3. She states her personal 

physician has left the area. No chest pain.





Review of Systems


Review of Systems





Constitutional: Denies fever or chills 


Eyes: Denies change in visual acuity, redness, or eye pain 


HENT: Denies nasal congestion or sore throat 


Respiratory: Denies cough or shortness of breath 


Cardiovascular: No chest pain


GI: POS abdominal pain, Some nausea, vomiting, No bloody stools or diarrhea 


: Denies dysuria or hematuria 


Musculoskeletal: Denies back pain or joint pain 


Integument: Denies rash or skin lesions 


Neurologic: Denies headache, focal weakness or sensory changes





Current Medications


Current Medications





Current Medications








 Medications


  (Trade)  Dose


 Ordered  Sig/Sandy  Start Time


 Stop Time Status Last Admin


Dose Admin


 


 Ondansetron HCl


  (Zofran)  4 mg  1X  ONCE  8/15/17 03:45


 8/15/17 03:46 DC 8/15/17 04:00


4 MG


 


 Sodium Chloride  1,000 ml @ 


 1,000 mls/hr  Q1H  8/15/17 02:45


 8/15/17 03:44 DC 8/15/17 04:00


1,000 MLS/HR











Allergies


Allergies





Allergies








Coded Allergies Type Severity Reaction Last Updated Verified


 


  gabapentin Allergy Intermediate  17 Yes


 


  NSAIDS (Non-Steroidal Anti-Inflamma Allergy Mild Nausea and Vomiting 17 

Yes











Physical Exam


Physical Exam





Constitutional: Well developed, well nourished, no acute distress, non-toxic 

appearance. 


HENT: Normocephalic, atraumatic, bilateral external ears normal, oropharynx 

moist, no oral exudates, nose normal. 


Eyes: PERRLA, EOMI, conjunctiva normal, no discharge.  


Neck: Normal range of motion, no tenderness, supple, no stridor.  


Cardiovascular:Heart rate regular rhythm, no murmur 


Lungs & Thorax:  Bilateral breath sounds clear to auscultation 


Abdomen: Bowel sounds normal, soft, minimal tenderness to epigastric area, no 

masses, no pulsatile masses. Rectal: normal tone; normal stool. Hemoccult 

negative per lab. 


Skin: Warm, dry, no erythema, no rash.  


Back: No tenderness, no CVA tenderness.  


Extremities: No tenderness, no cyanosis, no clubbing, ROM intact, no edema.  


Neurologic: Alert and oriented X 3, normal motor function, normal sensory 

function, no focal deficits noted. 


Psychologic: Affect normal, judgement normal, mood normal.





Current Patient Data


Vital Signs





 Vital Signs








  Date Time  Temp Pulse Resp B/P (MAP) Pulse Ox O2 Delivery O2 Flow Rate FiO2


 


8/15/17 02:45 98.2 96 20 153/89 (110) 92 Room Air  





 98.2       








Lab Values





 Laboratory Tests








Test


  8/15/17


03:15


 


Stool Occult Blood


  Negative (NEG)


 











Course & Med Decision Making


Course & Med Decision Making


Pertinent Labs and Imaging studies reviewed. (See chart for details)





Evaluated patient upon arrival. Patient with long standing use of narcotic pain 

meds and she state that she "doesn't like to use them.' Very difficulty IV 

stick. Zofran IV and Geodon IM (for chronic narcotic-resistant pain).





Dragon Disclaimer


Dragon Disclaimer


This electronic medical record was generated, in whole or in part, using a 

voice recognition dictation system.





Departure


Departure


Impression:  


 Primary Impression:  


 Abdominal pain


 Additional Impression:  


 Gastritis


Referrals:  


NO PCP (PCP)





Problem Qualifiers











ADAIR CASTRO MD Aug 15, 2017 04:09

## 2017-08-15 NOTE — PDOC1
History and Physical


Date of Admission


Date of Admission


DATE: 8/15/17 


TIME: 14:33





Identification/Chief Complaint


Chief Complaint


abd pain


Problems:  





Source


Source:  Caregiver, Chart review, Patient





History of Present Illness


History of Present Illness


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 

claims of coffee ground emesis, HGb 10 on admit, a drop from 12 when she was 

dcd. She was admitted here  for the same, EGD done showed gastritis, She 

claims she takes her PPI but I did ask her details (how frequent, carafate etc- 

which she was dcd on ) and she could not tell me these, Hence I doubt her 

compliance, I have had 5 calls at least just in AM from the RN for  multiple 

calls re pain meds, she has visited RN station maybe 10 times now for pain meds.


I told her I will not do dilaudid as this will not solve her gastritis.


Did discuss with Gi NP, could see as OP but they can also see inpt if she stays.


Will rpt HH sina, make sure not dropping, If stable, home sina


I did consult GI.





LAst abd ct was , showed:


IMPRESSION:


 


1. No evidence of bowel obstruction or hydronephrosis.


 


 


2. Cystic lesion left kidney.


 


 


3. Questionable region of nipple retraction on the right. This could be 


positional in nature but if there is physical exam finding of nipple 


retraction would consider obtaining a follow-up mammogram and/or 


ultrasound if one has not been obtained recently.





Past Medical History


Cardiovascular:  No pertinent hx


Pulmonary:  No pertinent hx


CENTRAL NERVOUS SYSTEM:  Periperal neuropathy


GI:  Peptic Ulcer disease


Heme/Onc:  Other


Hepatobiliary:  No pertinent hx


Psych:  Anxiety


Musculoskeletal:  Osteoarthritis


Rheumatologic:  No pertinent hx


Infectious disease:  No pertinent hx


Renal/:  No pertinent hx


Endocrine:  Hypothyroidism





Past Surgical History


Past Surgical History:  Appendectomy, Arthroscopy, Cholecystectomy, , 

Hysterectomy, Other





Family History


Family History:  Hypertension





Social History


Smoke:  No


ALCOHOL:  rare


Drugs:  None





Current Problem List


Problem List


Problems


Medical Problems:


(1) Abdominal pain


Status: Acute  





(2) Gastritis


Status: Acute  








Problems:  





Current Medications


Current Medications





Current Medications


Sodium Chloride 1,000 ml @  1,000 mls/hr Q1H IV  Last administered on 8/15/17at 

04:00;  Start 8/15/17 at 02:45;  Stop 8/15/17 at 03:44;  Status DC


Ondansetron HCl (Zofran) 4 mg 1X  ONCE IV  Last administered on 8/15/17at 04:00

;  Start 8/15/17 at 03:45;  Stop 8/15/17 at 03:46;  Status DC


Ziprasidone (Geodon Im) 10 mg 1X  ONCE IM ;  Start 8/15/17 at 04:30;  Stop 8/15/

17 at 04:57;  Status DC


Multi-Ingredient Mouthwash/Gargle (Gi Cocktail Single Dose) 15 ml 1X  ONCE SWSW

  Last administered on 8/15/17at 04:37;  Start 8/15/17 at 04:30;  Stop 8/15/17 

at 04:31;  Status DC


Fentanyl Citrate (Fentanyl 2ml Vial) 50 mcg 1X  ONCE IV  Last administered on 8/

15/17at 05:10;  Start 8/15/17 at 05:00;  Stop 8/15/17 at 05:04;  Status DC


Ondansetron HCl (Zofran) 4 mg PRN Q8HRS  PRN IV NAUSEA/VOMITING;  Start 8/15/17 

at 05:30;  Stop 8/15/17 at 09:42;  Status DC


Fentanyl Citrate (Fentanyl 2ml Vial) 50 mcg PRN Q2HR  PRN IV PAIN Last 

administered on 8/15/17at 06:14;  Start 8/15/17 at 05:30;  Stop 17 at 05:29


Famotidine (Pepcid) 20 mg BID IVP ;  Start 8/15/17 at 09:00;  Stop 8/15/17 at 09

:42;  Status DC


Morphine Sulfate 2 mg PRN Q2HR  PRN IV PAIN Last administered on 8/15/17at 12:26

;  Start 8/15/17 at 08:15


Ondansetron HCl (Zofran) 4 mg PRN Q6HRS  PRN IV NAUSEA/VOMITING Last 

administered on 8/15/17at 10:16;  Start 8/15/17 at 09:40;  Stop 17 at 09:39


Pantoprazole Sodium (Protonix) 40 mg BIDAC PO  Last administered on 8/15/17at 10

:21;  Start 8/15/17 at 10:00


Al Hydroxide/Mg Hydroxide (Mylanta Plus Xs) 30 ml PRN Q2HR  PRN PO HEARTBURN / 

GAS;  Start 8/15/17 at 09:45


Alprazolam (Xanax) 1 mg PRN Q6HRS  PRN PO ANXIETY / AGITATION Last administered 

on 8/15/17at 10:21;  Start 8/15/17 at 09:45


Carbamazepine (TEGretol) 200 mg DAILY PO  Last administered on 8/15/17at 10:21;

  Start 8/15/17 at 10:00


Cyclobenzaprine HCl (Flexeril) 10 mg PRN Q6HRS  PRN PO MUSCLE SPASMS;  Start 8/

15/17 at 09:45


Levothyroxine Sodium (Synthroid) 125 mcg DAILY07 PO  Last administered on 8/15/

17at 10:21;  Start 8/15/17 at 10:00


Oxycodone/ Acetaminophen (Percocet 5/325) 2 tab PRN Q4HRS  PRN PO MODERATE TO 

SEVERE PAIN;  Start 8/15/17 at 09:45


Sucralfate (Carafate) 1 gm TIDAC PO ;  Start 8/15/17 at 11:30


Tramadol HCl (Ultram) 50 mg BID PO ;  Start 8/15/17 at 11:00


Zolpidem Tartrate (Ambien) 5 mg QHS PO ;  Start 8/15/17 at 21:00


Pregabalin (Lyrica) 150 mg BID PO  Last administered on 8/15/17at 10:21;  Start 

8/15/17 at 10:00


Trazodone HCl (Desyrel) 300 mg QHS PO ;  Start 8/15/17 at 21:00


Venlafaxine HCl (Effexor) 75 mg QID PO  Last administered on 8/15/17at 12:30;  

Start 8/15/17 at 10:00


Non-Formulary Medication 1 tab PRN Q4HRS  PRN PO MODERATE PAIN;  Start 8/15/17 

at 09:45;  Status UNV





Active Scripts


Active


Sucralfate 1 Gm Tablet 1 Tab PO TID


Pantoprazole Sodium 40 Mg Tablet.dr 40 Mg PO BIDAC 15 Days


[Oxycodone Hcl/Acetaminophen] 1 TAB Tablet 1 Tab PO PRN Q4HRS PRN


Oxycodone-Acetaminophen 5-325 (Oxycodone Hcl/Acetaminophen) 1 Each Tablet 2 Tab 

PO PRN Q4HRS PRN


Cyclobenzaprine Hcl 10 Mg Tablet 10 Mg PO PRN Q6HRS PRN 5 Days


Reported


Tramadol Hcl 50 Mg Tablet 1 Tab PO BID


Alprazolam 1 Mg Tablet 1 Mg PO PRN Q6HRS PRN


Effexor Xr (Venlafaxine Hcl) 150 Mg Cap.er.24h 300 Mg PO DAILY


Lyrica (Pregabalin) 150 Mg Capsule 1 Cap PO BID


Ambien (Zolpidem Tartrate) 5 Mg Tablet 1 Tab PO QHS


Trazodone Hcl 300 Mg Tablet 1 Tab PO QHS


Levothyroxine Sodium 125 Mcg Tablet 1 Tab PO DAILY


Tegretol (Carbamazepine) 200 Mg Tablet 200 Mg PO 


Xanax (Alprazolam) 2 Mg Tablet 1 Tab PO TID





Allergies


Allergies:  


Coded Allergies:  


     gabapentin (Verified  Allergy, Intermediate, 17)


     NSAIDS (Non-Steroidal Anti-Inflamma (Verified  Allergy, Mild, Nausea and 

Vomiting, 17)





ROS


Review of System


crying in pain but no tears





Physical Exam


General:  Cooperative, No acute distress, Other (overtly dramatic)


HEENT:  PERRLA


Lungs:  Clear to auscultation, Normal air movement


Heart:  S1S2, RRR, no thrills, no rubs, no gallops, no murmurs


Cardiovascular:  S1, S2


Breasts:  Normal, Rt breast nml w/o mass, Lt breast nml w/o mass, Nipples normal


Abdomen:  Normal bowel sounds, Soft, No tenderness, No hepatosplenomegaly, No 

masses


Rectal Exam:  not examined


PELVIC:  Nml ext genitalia


Extremities:  No clubbing, No cyanosis, No edema, Normal pulses, No tenderness/

swelling


Skin:  No rashes, No breakdown, No significant lesion


Neuro:  Normal gait, Normal speech, Strength at 5/5 X4 ext, Normal tone, 

Sensation intact, Cranial nerves 3-12 NL, Reflexes 2+


Psych/Mental Status:  Mental status NL, Mood NL





Vitals


Vitals





Vital Signs








  Date Time  Temp Pulse Resp B/P (MAP) Pulse Ox O2 Delivery O2 Flow Rate FiO2


 


8/15/17 12:58     96 Room Air  


 


8/15/17 10:55 97.2 64 18 119/71 (87)    





 97.2       











Labs


Labs





Laboratory Tests








Test


  8/15/17


03:15 8/15/17


04:05


 


Stool Occult Blood Negative (NEG)  


 


White Blood Count


  


  8.2 x10^3/uL


(4.0-11.0)


 


Red Blood Count


  


  3.58 x10^6/uL


(3.50-5.40)


 


Hemoglobin


  


  10.9 g/dL


(12.0-15.5)


 


Hematocrit


  


  32.6 %


(36.0-47.0)


 


Mean Corpuscular Volume  91 fL () 


 


Mean Corpuscular Hemoglobin  31 pg (25-35) 


 


Mean Corpuscular Hemoglobin


Concent 


  34 g/dL


(31-37)


 


Red Cell Distribution Width


  


  14.5 %


(11.5-14.5)


 


Platelet Count


  


  216 x10^3/uL


(140-400)


 


Neutrophils (%) (Auto)  61 % (31-73) 


 


Lymphocytes (%) (Auto)  30 % (24-48) 


 


Monocytes (%) (Auto)  8 % (0-9) 


 


Eosinophils (%) (Auto)  0 % (0-3) 


 


Basophils (%) (Auto)  1 % (0-3) 


 


Neutrophils # (Auto)


  


  5.0 x10^3uL


(1.8-7.7)


 


Lymphocytes # (Auto)


  


  2.5 x10^3/uL


(1.0-4.8)


 


Monocytes # (Auto)


  


  0.6 x10^3/uL


(0.0-1.1)


 


Eosinophils # (Auto)


  


  0.0 x10^3/uL


(0.0-0.7)


 


Basophils # (Auto)


  


  0.0 x10^3/uL


(0.0-0.2)


 


Prothrombin Time


  


  12.5 SEC


(11.7-14.0)


 


Prothromb Time International


Ratio 


  1.0 (0.8-1.1) 


 


 


Activated Partial


Thromboplast Time 


  30 SEC (24-38) 


 


 


Sodium Level


  


  142 mmol/L


(136-145)


 


Potassium Level


  


  3.9 mmol/L


(3.5-5.1)


 


Chloride Level


  


  105 mmol/L


()


 


Carbon Dioxide Level


  


  28 mmol/L


(21-32)


 


Anion Gap  9 (6-14) 


 


Blood Urea Nitrogen


  


  25 mg/dL


(7-20)


 


Creatinine


  


  0.7 mg/dL


(0.6-1.0)


 


Estimated GFR


(Cockcroft-Gault) 


  86.2 


 


 


Glucose Level


  


  100 mg/dL


(70-99)


 


Calcium Level


  


  8.7 mg/dL


(8.5-10.1)


 


Total Bilirubin


  


  0.1 mg/dL


(0.2-1.0)


 


Direct Bilirubin


  


  < 0.1 mg/dL


(0.0-0.2)


 


Aspartate Amino Transf


(AST/SGOT) 


  10 U/L (15-37) 


 


 


Alanine Aminotransferase


(ALT/SGPT) 


  17 U/L (14-59) 


 


 


Alkaline Phosphatase


  


  97 U/L


()


 


Total Protein


  


  6.8 g/dL


(6.4-8.2)


 


Albumin


  


  3.3 g/dL


(3.4-5.0)


 


Lipase


  


  140 U/L


()








Laboratory Tests








Test


  8/15/17


03:15 8/15/17


04:05


 


Stool Occult Blood Negative (NEG)  


 


White Blood Count


  


  8.2 x10^3/uL


(4.0-11.0)


 


Red Blood Count


  


  3.58 x10^6/uL


(3.50-5.40)


 


Hemoglobin


  


  10.9 g/dL


(12.0-15.5)


 


Hematocrit


  


  32.6 %


(36.0-47.0)


 


Mean Corpuscular Volume  91 fL () 


 


Mean Corpuscular Hemoglobin  31 pg (25-35) 


 


Mean Corpuscular Hemoglobin


Concent 


  34 g/dL


(31-37)


 


Red Cell Distribution Width


  


  14.5 %


(11.5-14.5)


 


Platelet Count


  


  216 x10^3/uL


(140-400)


 


Neutrophils (%) (Auto)  61 % (31-73) 


 


Lymphocytes (%) (Auto)  30 % (24-48) 


 


Monocytes (%) (Auto)  8 % (0-9) 


 


Eosinophils (%) (Auto)  0 % (0-3) 


 


Basophils (%) (Auto)  1 % (0-3) 


 


Neutrophils # (Auto)


  


  5.0 x10^3uL


(1.8-7.7)


 


Lymphocytes # (Auto)


  


  2.5 x10^3/uL


(1.0-4.8)


 


Monocytes # (Auto)


  


  0.6 x10^3/uL


(0.0-1.1)


 


Eosinophils # (Auto)


  


  0.0 x10^3/uL


(0.0-0.7)


 


Basophils # (Auto)


  


  0.0 x10^3/uL


(0.0-0.2)


 


Prothrombin Time


  


  12.5 SEC


(11.7-14.0)


 


Prothromb Time International


Ratio 


  1.0 (0.8-1.1) 


 


 


Activated Partial


Thromboplast Time 


  30 SEC (24-38) 


 


 


Sodium Level


  


  142 mmol/L


(136-145)


 


Potassium Level


  


  3.9 mmol/L


(3.5-5.1)


 


Chloride Level


  


  105 mmol/L


()


 


Carbon Dioxide Level


  


  28 mmol/L


(21-32)


 


Anion Gap  9 (6-14) 


 


Blood Urea Nitrogen


  


  25 mg/dL


(7-20)


 


Creatinine


  


  0.7 mg/dL


(0.6-1.0)


 


Estimated GFR


(Cockcroft-Gault) 


  86.2 


 


 


Glucose Level


  


  100 mg/dL


(70-99)


 


Calcium Level


  


  8.7 mg/dL


(8.5-10.1)


 


Total Bilirubin


  


  0.1 mg/dL


(0.2-1.0)


 


Direct Bilirubin


  


  < 0.1 mg/dL


(0.0-0.2)


 


Aspartate Amino Transf


(AST/SGOT) 


  10 U/L (15-37) 


 


 


Alanine Aminotransferase


(ALT/SGPT) 


  17 U/L (14-59) 


 


 


Alkaline Phosphatase


  


  97 U/L


()


 


Total Protein


  


  6.8 g/dL


(6.4-8.2)


 


Albumin


  


  3.3 g/dL


(3.4-5.0)


 


Lipase


  


  140 U/L


()











VTE Prophylaxis Ordered


VTE Prophylaxis Devices:  Yes


VTE Pharmacological Prophylaxi:  Yes





Assessment/Plan


Assessment/Plan


1. GAstritis, non compliant with PPI BID and carafate TID


2. NOrmocytic anemia - did drop to 10 from 12 on dc last 


3. NArc addiction





PLAN:


Resume home PPI and CArafate


Monitor for coffee ground - she claims this happened at 2 AM, staff unable to 

verify this to me


EVER andino


If stable home sina Ceballos of GI and RN and patient - As per GI, unlikely plans of re scoping


NO DILAUDID PLS


Difficult pt, signif time











CECILIA ZARATE MD Aug 15, 2017 14:39

## 2017-08-15 NOTE — PDOC2
GI CONSULT


Reason For Consult:


Abd pain, dropping Hgb





HPI:


HPI:


58 y/o female who we know from previous admission in 6/2017.  At that time 

reported upper abdominal pain w/ coffee-ground emesis and gave h/o previous GI 

bleed/PUD (EGD and colonoscopy in [place]) attributed to NSAIDs.  Underwent EGD on 6/ 12/17 which revealed erosive gastritis.  Hgb remained WNL during that admission 

and recurrent bleeding/vomiting was not witnessed (although she reported she 

"vomited blood all over herself" prior to discharge).  Did display drug-seeking 

behavior (w/ Dilaudid).  Was advised to continue PPI and avoid NSAIDs.  Has 

been to ER twice since then for same symptoms, Hgb normal (12s) each time.  On 

this occasion, again reports upper abd pain and coffee-ground emesis.  Has felt 

ill since yesterday w/ bloating and nausea, kept to a diet of mostly liquids.  

Then awoke at 2:30 a.m. w/ coffee-ground emesis and "tasted blood."  Took 3 

Zofran and came to the ER.  This time, Hgb is 10.9 and BUN is 27. Stool occult 

blood was negative.  When asked about her bowel habits, she first reports 1 

formed normal-colored stool daily, but then says stools have been "tarry."  She 

is quite upset, speaking loudly and near tears, saying it has been implied she 

is drug-seeking.  She says she hates narcotics.  Has had clear liquids today.





She tells me she has continued Pantoprazole 40mg BID + sucralfate BID but has 

also continued "off and on" prednisone "packs" and generally takes 4 Aleve QD 

despite her previous history of PUD and recent EGD w/ erosive gastritis.  "I don

't want any narcotics.  I don't need Dilaudid or Fentanly but I need something 

more than they're giving me now."  She says pain is too intense without NSAIDs 

but has a hard time describing where her pain is, eventually settling on "my 

joints."  Still does not have a PCP but has been seeing Dr. Westbrook (psych) for 

who prescribes her Effexor, Xanax, and Lyrica.  Her brother was found dead in 

her home earlier this year, presumably from alcoholism.  Note previously 

reported hepatotoxicity 2/2 Imuran trial for lupus.





PMH:


PMH:


lupus, anxiety/depression, PUD, hiatal hernia, MARIA INES, peripheral neuropathy, C-

section, cholecystectomy, appendectomy, hysterectomy, breast augmentation x 2, 

right ankle surgery, left shoulder surgery





FH:


Family History:  Cancer (previously reported sister had breast cancer, now says 

aunt and mother had colon cancer and sister had sinus cancer), Other (brother - 

alcoholism)





Social History:


Smoke:  No


ALCOHOL:  rare


Drugs:  None





ROS:





GEN: Denies fevers, chills, sweats


HEENT: Denies blurred vision, sore throat


CV: Denies chest pain


RESP: Denies shortness of air, cough


GI: Per HPI


: Denies hematuria, dysuria


ENDO: Denies weight changes


NEURO: +dizziness


MSK: Denies weakness, joint pain/swelling


SKIN: Denies jaundice, pruritus





Vitals:


Vitals:





 Vital Signs








  Date Time  Temp Pulse Resp B/P (MAP) Pulse Ox O2 Delivery O2 Flow Rate FiO2


 


8/15/17 14:42     96 Room Air  


 


8/15/17 10:55 97.2 64 18 119/71 (87)    





 97.2       











Labs:


Labs:





Laboratory Tests








Test


  8/15/17


03:15 8/15/17


04:05


 


Stool Occult Blood Negative (NEG)  


 


White Blood Count


  


  8.2 x10^3/uL


(4.0-11.0)


 


Red Blood Count


  


  3.58 x10^6/uL


(3.50-5.40)


 


Hemoglobin


  


  10.9 g/dL


(12.0-15.5)


 


Hematocrit


  


  32.6 %


(36.0-47.0)


 


Mean Corpuscular Volume  91 fL () 


 


Mean Corpuscular Hemoglobin  31 pg (25-35) 


 


Mean Corpuscular Hemoglobin


Concent 


  34 g/dL


(31-37)


 


Red Cell Distribution Width


  


  14.5 %


(11.5-14.5)


 


Platelet Count


  


  216 x10^3/uL


(140-400)


 


Neutrophils (%) (Auto)  61 % (31-73) 


 


Lymphocytes (%) (Auto)  30 % (24-48) 


 


Monocytes (%) (Auto)  8 % (0-9) 


 


Eosinophils (%) (Auto)  0 % (0-3) 


 


Basophils (%) (Auto)  1 % (0-3) 


 


Neutrophils # (Auto)


  


  5.0 x10^3uL


(1.8-7.7)


 


Lymphocytes # (Auto)


  


  2.5 x10^3/uL


(1.0-4.8)


 


Monocytes # (Auto)


  


  0.6 x10^3/uL


(0.0-1.1)


 


Eosinophils # (Auto)


  


  0.0 x10^3/uL


(0.0-0.7)


 


Basophils # (Auto)


  


  0.0 x10^3/uL


(0.0-0.2)


 


Prothrombin Time


  


  12.5 SEC


(11.7-14.0)


 


Prothromb Time International


Ratio 


  1.0 (0.8-1.1) 


 


 


Activated Partial


Thromboplast Time 


  30 SEC (24-38) 


 


 


Sodium Level


  


  142 mmol/L


(136-145)


 


Potassium Level


  


  3.9 mmol/L


(3.5-5.1)


 


Chloride Level


  


  105 mmol/L


()


 


Carbon Dioxide Level


  


  28 mmol/L


(21-32)


 


Anion Gap  9 (6-14) 


 


Blood Urea Nitrogen


  


  25 mg/dL


(7-20)


 


Creatinine


  


  0.7 mg/dL


(0.6-1.0)


 


Estimated GFR


(Cockcroft-Gault) 


  86.2 


 


 


Glucose Level


  


  100 mg/dL


(70-99)


 


Calcium Level


  


  8.7 mg/dL


(8.5-10.1)


 


Total Bilirubin


  


  0.1 mg/dL


(0.2-1.0)


 


Direct Bilirubin


  


  < 0.1 mg/dL


(0.0-0.2)


 


Aspartate Amino Transf


(AST/SGOT) 


  10 U/L (15-37) 


 


 


Alanine Aminotransferase


(ALT/SGPT) 


  17 U/L (14-59) 


 


 


Alkaline Phosphatase


  


  97 U/L


()


 


Total Protein


  


  6.8 g/dL


(6.4-8.2)


 


Albumin


  


  3.3 g/dL


(3.4-5.0)


 


Lipase


  


  140 U/L


()











Allergies:


Coded Allergies:  


     gabapentin (Verified  Allergy, Intermediate, 6/12/17)


     NSAIDS (Non-Steroidal Anti-Inflamma (Verified  Allergy, Mild, Nausea and 

Vomiting, 6/12/17)





Medications:





Current Medications








 Medications


  (Trade)  Dose


 Ordered  Sig/Sandy


 Route


 PRN Reason  Start Time


 Stop Time Status Last Admin


Dose Admin


 


 Sodium Chloride  1,000 ml @ 


 1,000 mls/hr  Q1H


 IV


   8/15/17 02:45


 8/15/17 03:44 DC 8/15/17 04:00


 


 


 Ondansetron HCl


  (Zofran)  4 mg  1X  ONCE


 IV


   8/15/17 03:45


 8/15/17 03:46 DC 8/15/17 04:00


 


 


 Multi-Ingredient


 Mouthwash/Gargle


  (Gi Cocktail


 Single Dose)  15 ml  1X  ONCE


 SWSW


   8/15/17 04:30


 8/15/17 04:31 DC 8/15/17 04:37


 


 


 Fentanyl Citrate


  (Fentanyl 2ml


 Vial)  50 mcg  1X  ONCE


 IV


   8/15/17 05:00


 8/15/17 05:04 DC 8/15/17 05:10


 


 


 Fentanyl Citrate


  (Fentanyl 2ml


 Vial)  50 mcg  PRN Q2HR  PRN


 IV


 PAIN  8/15/17 05:30


 8/15/17 14:34 DC 8/15/17 06:14


 


 


 Morphine Sulfate  2 mg  PRN Q2HR  PRN


 IV


 PAIN  8/15/17 08:15


 8/15/17 14:34 DC 8/15/17 12:26


 


 


 Ondansetron HCl


  (Zofran)  4 mg  PRN Q6HRS  PRN


 IV


 NAUSEA/VOMITING  8/15/17 09:40


 8/16/17 09:39  8/15/17 10:16


 


 


 Pantoprazole


 Sodium


  (Protonix)  40 mg  BIDAC


 PO


   8/15/17 10:00


    8/15/17 10:21


 


 


 Alprazolam


  (Xanax)  1 mg  PRN Q6HRS  PRN


 PO


 ANXIETY / AGITATION  8/15/17 09:45


    8/15/17 10:21


 


 


 Carbamazepine


  (TEGretol)  200 mg  DAILY


 PO


   8/15/17 10:00


    8/15/17 10:21


 


 


 Levothyroxine


 Sodium


  (Synthroid)  125 mcg  DAILY07


 PO


   8/15/17 10:00


    8/15/17 10:21


 


 


 Pregabalin


  (Lyrica)  150 mg  BID


 PO


   8/15/17 10:00


    8/15/17 10:21


 


 


 Venlafaxine HCl


  (Effexor)  75 mg  QID


 PO


   8/15/17 10:00


    8/15/17 12:30


 


 


 Morphine Sulfate  4 mg  PRN Q2HR  PRN


 IV


 PAIN  8/15/17 14:45


    8/15/17 14:42


 











Imaging:


Imaging:


*****************


CT A/P 6/20/17


FINDINGS:


Abdomen:


Chest Base: Partially imaged without gross abnormality.


Vessels: No abdominal aortic aneurysm.


Liver/Biliary: Postcholecystectomy.


Pancreas: No peripancreatic edema.


Spleen: Normal.


Kidneys/Adrenal: 25 mm cystic lesion left kidney. No hydronephrosis.


GI: Appendix not well seen. No dilated loops of bowel to suggest obstruction.


Contrast is seen within the left common femoral vein layering into the left 

iliac veins.


Pelvis:


Bladder: No definite adjacent inflammation.


Degenerative changes spine degenerative changes right hip.


IMPRESSION:


1. No evidence of bowel obstruction or hydronephrosis.


2. Cystic lesion left kidney.


3. Questionable region of nipple retraction on the right. This could be 

positional in nature but if there is physical exam finding of nipple retraction 

would consider obtaining a follow-up mammogram and/or ultrasound if one has not 

been obtained recently.





PE:





GEN: upset


HEENT: Atraumatic, PERRL


LUNGS: CTAB anteriorly


HEART: RRR


ABD: BS+, overweight/possibly some distention, not tender w/ placement of 

stethoscope, palpation w/ hand reveals tenderness in BUQ and epigastrium


EXTREMITY: No edema


SKIN: No rashes, no jaundice


NEURO/PSYCH: A & O 3





A/P:


A/P:


Upper abd pain


Coffee-ground emesis, ?tarry stool


Normocytic anemia w/ elevated BUN, stool occult negative


H/o PUD, recent EGD w/ erosive gastritis, on PPI


NSAID use


H/o drug-seeking behavior, chronic pain, lupus, psych issues


CRC screen - colonoscopy in [place] ~2 years ago





--


Recurrent ER visits/admissions w/ EGD 9 weeks ago, known erosive gastritis.


Hgb 10.9 compared to 12.7 in June.  BUN elevated.


Tolerated clears today, plans to advance to full liquids.


Agree w/ continued PPI, sucralfate okay.  ?GI cocktail for pain





D/w Dr. Parkinson - no plans to repeat EGD at this time.  


Needs to establish PCP and rheumatology as outpt re: lupus, pain.  She has 

health insurance but says some days she feels too bad to think about finding a 

doctor.


Will ask neuro to see, r/o lupus cerebritis.











NAHUN REED Aug 15, 2017 15:30

## 2017-08-15 NOTE — PDOC2
NEUROLOGY CONSULT


Date of Admission


Date of Admission


DATE: 8/15/17 


TIME: 17:40





Reason for Consult


Reason for Consult:


IMPRESSION:


Lupus cognitive impairment.


Psychiatric issues.


GI bleeding.


Erosive gastritis.


Neuropathy in LE.


Anxiety.


Narcotics dependence.


Doubt lupus cerebritis at the present time.





RECOMMENDATIONS/PLAN:


EEG


Lab: see orders.


Treat medical diseases.





HISTORY OF THE PRESENT ILLNESS:


57-y-old  female patient with Hx of lupus that was diagnosed about 4 

years ago. She said she has been taking non-steroids antiinflammatory 

medications and has erosive gastritis. She has nausea and vomiting brown 

colored gastric content to be admitted into the hospital. She was observed some 

behavior changes so Neurology was called for consultation.





Past Medical History


Cardiovascular:  No pertinent hx


Pulmonary:  No pertinent hx


CENTRAL NERVOUS SYSTEM:  Periperal neuropathy


GI:  Peptic Ulcer disease


Heme/Onc:  Other


Hepatobiliary:  No pertinent hx


Psych:  Anxiety


Musculoskeletal:  Osteoarthritis


Rheumatologic:  No pertinent hx


Infectious disease:  No pertinent hx


Renal/:  No pertinent hx


Endocrine:  Hypothyroidism





Past Surgical History


Appendectomy, Arthroscopy, Cholecystectomy, , Hysterectomy, Other





Family History


Hypertension





Social History


Smoke:  No


ALCOHOL:  rare


Drugs:  None





ALLERGY:


Reviewed.





MEDICATIONS:


Refer to MAR





REVIEW OF SYSTEMS:


Constitutional: No malnutrition, weight loss, cachexia.


Head: No traumatic brain or head injury.


Skin: No edema, or rash.


Ear: No infection.


Eyes: No vision loss or color blindness.


Nose: No bleeding or purulent discharges.


Hearing: No hearing decrease.


Neck: No injury.


Breast: No history of cancer, masses,or discharges.


Cardiac: No MI, arrhythmia.


Pulmonary: No COPD.


GI: GI bleeding this time.


Urinary/genital: UTI.


Endocrinologic: Obesity.


Skeletomuscular: No muscular atrophy, deformity.


Neurological: see  HP.


Psychiatric: Denies drug use/abuse.


Otherwise, not pertinant14-point review of systems.





PHYSICAL EXAMINATION:   


General appearance is talkative..  


HEENT:  Normocephalic and nontraumatic.  Eyes, nose, ears, and throat are 

unremarkable.  


Neck is supple. No lymphadenopathy. No bruits are heard over the carotid 

artery.  No crepitus. 


Cardiovascular:  S1, S2, regular rate and rhythm.  


Pulmonary:  Clear to auscultation bilaterally. 


Abdomen:  Bowel sounds are positive.    


Extremities:  No rash, lesions, or edema.  


No restriction of range of motion





NEUROLOGICAL  EXAMINATION:


Alert 


Oriented to time, place and person.


PERRL.


EOMI.


CN: no focal findings.


Muscle tone: within normal.


Muscle strength: 5


DTR: 2


Plantar reflex: Flexor response bilaterally 


Gait: not examined in bed. 


Sensory exam: no abnormal findings.


No cerebellar signs elicited.


F-T-N test fine.





Current Medications


Current Medications





Current Medications


Sodium Chloride 1,000 ml @  1,000 mls/hr Q1H IV  Last administered on 8/15/17at 

04:00;  Start 8/15/17 at 02:45;  Stop 8/15/17 at 03:44;  Status DC


Ondansetron HCl (Zofran) 4 mg 1X  ONCE IV  Last administered on 8/15/17at 04:00

;  Start 8/15/17 at 03:45;  Stop 8/15/17 at 03:46;  Status DC


Ziprasidone (Geodon Im) 10 mg 1X  ONCE IM ;  Start 8/15/17 at 04:30;  Stop 8/15/

17 at 04:57;  Status DC


Multi-Ingredient Mouthwash/Gargle (Gi Cocktail Single Dose) 15 ml 1X  ONCE SWSW

  Last administered on 8/15/17at 04:37;  Start 8/15/17 at 04:30;  Stop 8/15/17 

at 04:31;  Status DC


Fentanyl Citrate (Fentanyl 2ml Vial) 50 mcg 1X  ONCE IV  Last administered on 8/

15/17at 05:10;  Start 8/15/17 at 05:00;  Stop 8/15/17 at 05:04;  Status DC


Ondansetron HCl (Zofran) 4 mg PRN Q8HRS  PRN IV NAUSEA/VOMITING;  Start 8/15/17 

at 05:30;  Stop 8/15/17 at 09:42;  Status DC


Fentanyl Citrate (Fentanyl 2ml Vial) 50 mcg PRN Q2HR  PRN IV PAIN Last 

administered on 8/15/17at 06:14;  Start 8/15/17 at 05:30;  Stop 8/15/17 at 14:34

;  Status DC


Famotidine (Pepcid) 20 mg BID IVP ;  Start 8/15/17 at 09:00;  Stop 8/15/17 at 09

:42;  Status DC


Morphine Sulfate 2 mg PRN Q2HR  PRN IV PAIN Last administered on 8/15/17at 12:26

;  Start 8/15/17 at 08:15;  Stop 8/15/17 at 14:34;  Status DC


Ondansetron HCl (Zofran) 4 mg PRN Q6HRS  PRN IV NAUSEA/VOMITING Last 

administered on 8/15/17at 10:16;  Start 8/15/17 at 09:40;  Stop 17 at 09:39


Pantoprazole Sodium (Protonix) 40 mg BIDAC PO  Last administered on 8/15/17at 17

:05;  Start 8/15/17 at 10:00


Al Hydroxide/Mg Hydroxide (Mylanta Plus Xs) 30 ml PRN Q2HR  PRN PO HEARTBURN / 

GAS;  Start 8/15/17 at 09:45


Alprazolam (Xanax) 1 mg PRN Q6HRS  PRN PO ANXIETY / AGITATION Last administered 

on 8/15/17at 17:05;  Start 8/15/17 at 09:45


Carbamazepine (TEGretol) 200 mg DAILY PO  Last administered on 8/15/17at 10:21;

  Start 8/15/17 at 10:00


Cyclobenzaprine HCl (Flexeril) 10 mg PRN Q6HRS  PRN PO MUSCLE SPASMS;  Start 8/

15/17 at 09:45


Levothyroxine Sodium (Synthroid) 125 mcg DAILY07 PO  Last administered on 8/15/

17at 10:21;  Start 8/15/17 at 10:00


Oxycodone/ Acetaminophen (Percocet 5/325) 2 tab PRN Q4HRS  PRN PO MODERATE TO 

SEVERE PAIN;  Start 8/15/17 at 09:45


Sucralfate (Carafate) 1 gm TIDAC PO  Last administered on 8/15/17at 17:05;  

Start 8/15/17 at 11:30


Tramadol HCl (Ultram) 50 mg BID PO ;  Start 8/15/17 at 11:00


Zolpidem Tartrate (Ambien) 5 mg QHS PO ;  Start 8/15/17 at 21:00


Pregabalin (Lyrica) 150 mg BID PO  Last administered on 8/15/17at 10:21;  Start 

8/15/17 at 10:00


Trazodone HCl (Desyrel) 300 mg QHS PO ;  Start 8/15/17 at 21:00


Venlafaxine HCl (Effexor) 75 mg QID PO  Last administered on 8/15/17at 17:05;  

Start 8/15/17 at 10:00


Non-Formulary Medication 1 tab PRN Q4HRS  PRN PO MODERATE PAIN;  Start 8/15/17 

at 09:45;  Status UNV


Morphine Sulfate 4 mg PRN Q2HR  PRN IV PAIN Last administered on 8/15/17at 17:06

;  Start 8/15/17 at 14:45


Prednisone (Prednisone) 20 mg DAILY PO ;  Start 8/15/17 at 18:00





Active Scripts


Active


Sucralfate 1 Gm Tablet 1 Tab PO TID


Pantoprazole Sodium 40 Mg Tablet.dr 40 Mg PO BIDAC 15 Days


[Oxycodone Hcl/Acetaminophen] 1 TAB Tablet 1 Tab PO PRN Q4HRS PRN


Oxycodone-Acetaminophen 5-325 (Oxycodone Hcl/Acetaminophen) 1 Each Tablet 2 Tab 

PO PRN Q4HRS PRN


Cyclobenzaprine Hcl 10 Mg Tablet 10 Mg PO PRN Q6HRS PRN 5 Days


Reported


Tramadol Hcl 50 Mg Tablet 1 Tab PO BID


Alprazolam 1 Mg Tablet 1 Mg PO PRN Q6HRS PRN


Effexor Xr (Venlafaxine Hcl) 150 Mg Cap.er.24h 300 Mg PO DAILY


Lyrica (Pregabalin) 150 Mg Capsule 1 Cap PO BID


Ambien (Zolpidem Tartrate) 5 Mg Tablet 1 Tab PO QHS


Trazodone Hcl 300 Mg Tablet 1 Tab PO QHS


Levothyroxine Sodium 125 Mcg Tablet 1 Tab PO DAILY


Tegretol (Carbamazepine) 200 Mg Tablet 200 Mg PO 


Xanax (Alprazolam) 2 Mg Tablet 1 Tab PO TID





Allergies


Allergies:  


Coded Allergies:  


     gabapentin (Verified  Allergy, Intermediate, 17)


     NSAIDS (Non-Steroidal Anti-Inflamma (Verified  Allergy, Mild, Nausea and 

Vomiting, 17)





Vitals


VITALS





Vital Signs








  Date Time  Temp Pulse Resp B/P (MAP) Pulse Ox O2 Delivery O2 Flow Rate FiO2


 


8/15/17 17:06     96 Room Air  


 


8/15/17 15:25 97.7 77 19 114/81 (92)    





 97.7       











Labs


Labs





Laboratory Tests








Test


  8/15/17


03:15 8/15/17


04:05


 


Stool Occult Blood Negative (NEG)  


 


White Blood Count


  


  8.2 x10^3/uL


(4.0-11.0)


 


Red Blood Count


  


  3.58 x10^6/uL


(3.50-5.40)


 


Hemoglobin


  


  10.9 g/dL


(12.0-15.5)


 


Hematocrit


  


  32.6 %


(36.0-47.0)


 


Mean Corpuscular Volume  91 fL () 


 


Mean Corpuscular Hemoglobin  31 pg (25-35) 


 


Mean Corpuscular Hemoglobin


Concent 


  34 g/dL


(31-37)


 


Red Cell Distribution Width


  


  14.5 %


(11.5-14.5)


 


Platelet Count


  


  216 x10^3/uL


(140-400)


 


Neutrophils (%) (Auto)  61 % (31-73) 


 


Lymphocytes (%) (Auto)  30 % (24-48) 


 


Monocytes (%) (Auto)  8 % (0-9) 


 


Eosinophils (%) (Auto)  0 % (0-3) 


 


Basophils (%) (Auto)  1 % (0-3) 


 


Neutrophils # (Auto)


  


  5.0 x10^3uL


(1.8-7.7)


 


Lymphocytes # (Auto)


  


  2.5 x10^3/uL


(1.0-4.8)


 


Monocytes # (Auto)


  


  0.6 x10^3/uL


(0.0-1.1)


 


Eosinophils # (Auto)


  


  0.0 x10^3/uL


(0.0-0.7)


 


Basophils # (Auto)


  


  0.0 x10^3/uL


(0.0-0.2)


 


Prothrombin Time


  


  12.5 SEC


(11.7-14.0)


 


Prothromb Time International


Ratio 


  1.0 (0.8-1.1) 


 


 


Activated Partial


Thromboplast Time 


  30 SEC (24-38) 


 


 


Sodium Level


  


  142 mmol/L


(136-145)


 


Potassium Level


  


  3.9 mmol/L


(3.5-5.1)


 


Chloride Level


  


  105 mmol/L


()


 


Carbon Dioxide Level


  


  28 mmol/L


(21-32)


 


Anion Gap  9 (6-14) 


 


Blood Urea Nitrogen


  


  25 mg/dL


(7-20)


 


Creatinine


  


  0.7 mg/dL


(0.6-1.0)


 


Estimated GFR


(Cockcroft-Gault) 


  86.2 


 


 


Glucose Level


  


  100 mg/dL


(70-99)


 


Calcium Level


  


  8.7 mg/dL


(8.5-10.1)


 


Total Bilirubin


  


  0.1 mg/dL


(0.2-1.0)


 


Direct Bilirubin


  


  < 0.1 mg/dL


(0.0-0.2)


 


Aspartate Amino Transf


(AST/SGOT) 


  10 U/L (15-37) 


 


 


Alanine Aminotransferase


(ALT/SGPT) 


  17 U/L (14-59) 


 


 


Alkaline Phosphatase


  


  97 U/L


()


 


Total Protein


  


  6.8 g/dL


(6.4-8.2)


 


Albumin


  


  3.3 g/dL


(3.4-5.0)


 


Lipase


  


  140 U/L


()








Laboratory Tests








Test


  8/15/17


03:15 8/15/17


04:05


 


Stool Occult Blood Negative (NEG)  


 


White Blood Count


  


  8.2 x10^3/uL


(4.0-11.0)


 


Red Blood Count


  


  3.58 x10^6/uL


(3.50-5.40)


 


Hemoglobin


  


  10.9 g/dL


(12.0-15.5)


 


Hematocrit


  


  32.6 %


(36.0-47.0)


 


Mean Corpuscular Volume  91 fL () 


 


Mean Corpuscular Hemoglobin  31 pg (25-35) 


 


Mean Corpuscular Hemoglobin


Concent 


  34 g/dL


(31-37)


 


Red Cell Distribution Width


  


  14.5 %


(11.5-14.5)


 


Platelet Count


  


  216 x10^3/uL


(140-400)


 


Neutrophils (%) (Auto)  61 % (31-73) 


 


Lymphocytes (%) (Auto)  30 % (24-48) 


 


Monocytes (%) (Auto)  8 % (0-9) 


 


Eosinophils (%) (Auto)  0 % (0-3) 


 


Basophils (%) (Auto)  1 % (0-3) 


 


Neutrophils # (Auto)


  


  5.0 x10^3uL


(1.8-7.7)


 


Lymphocytes # (Auto)


  


  2.5 x10^3/uL


(1.0-4.8)


 


Monocytes # (Auto)


  


  0.6 x10^3/uL


(0.0-1.1)


 


Eosinophils # (Auto)


  


  0.0 x10^3/uL


(0.0-0.7)


 


Basophils # (Auto)


  


  0.0 x10^3/uL


(0.0-0.2)


 


Prothrombin Time


  


  12.5 SEC


(11.7-14.0)


 


Prothromb Time International


Ratio 


  1.0 (0.8-1.1) 


 


 


Activated Partial


Thromboplast Time 


  30 SEC (24-38) 


 


 


Sodium Level


  


  142 mmol/L


(136-145)


 


Potassium Level


  


  3.9 mmol/L


(3.5-5.1)


 


Chloride Level


  


  105 mmol/L


()


 


Carbon Dioxide Level


  


  28 mmol/L


(21-32)


 


Anion Gap  9 (6-14) 


 


Blood Urea Nitrogen


  


  25 mg/dL


(7-20)


 


Creatinine


  


  0.7 mg/dL


(0.6-1.0)


 


Estimated GFR


(Cockcroft-Gault) 


  86.2 


 


 


Glucose Level


  


  100 mg/dL


(70-99)


 


Calcium Level


  


  8.7 mg/dL


(8.5-10.1)


 


Total Bilirubin


  


  0.1 mg/dL


(0.2-1.0)


 


Direct Bilirubin


  


  < 0.1 mg/dL


(0.0-0.2)


 


Aspartate Amino Transf


(AST/SGOT) 


  10 U/L (15-37) 


 


 


Alanine Aminotransferase


(ALT/SGPT) 


  17 U/L (14-59) 


 


 


Alkaline Phosphatase


  


  97 U/L


()


 


Total Protein


  


  6.8 g/dL


(6.4-8.2)


 


Albumin


  


  3.3 g/dL


(3.4-5.0)


 


Lipase


  


  140 U/L


()

















GABRIELLE ACKERMAN MD Aug 15, 2017 17:52

## 2017-08-16 VITALS — DIASTOLIC BLOOD PRESSURE: 51 MMHG | SYSTOLIC BLOOD PRESSURE: 103 MMHG

## 2017-08-16 VITALS — DIASTOLIC BLOOD PRESSURE: 77 MMHG | SYSTOLIC BLOOD PRESSURE: 118 MMHG

## 2017-08-16 VITALS — DIASTOLIC BLOOD PRESSURE: 55 MMHG | SYSTOLIC BLOOD PRESSURE: 100 MMHG

## 2017-08-16 LAB
HCT VFR BLD CALC: 32.9 % (ref 36–47)
HGB BLD-MCNC: 11 G/DL (ref 12–15.5)
MCHC RBC AUTO-ENTMCNC: 33 G/DL (ref 31–37)

## 2017-08-16 RX ADMIN — MORPHINE SULFATE PRN MG: 4 INJECTION, SOLUTION INTRAMUSCULAR; INTRAVENOUS at 11:32

## 2017-08-16 RX ADMIN — SUCRALFATE SCH GM: 1 TABLET ORAL at 12:40

## 2017-08-16 RX ADMIN — PANTOPRAZOLE SODIUM SCH MG: 40 TABLET, DELAYED RELEASE ORAL at 07:30

## 2017-08-16 RX ADMIN — SUCRALFATE SCH GM: 1 TABLET ORAL at 07:30

## 2017-08-16 RX ADMIN — VENLAFAXINE HYDROCHLORIDE SCH MG: 75 TABLET ORAL at 12:40

## 2017-08-16 RX ADMIN — VENLAFAXINE HYDROCHLORIDE SCH MG: 75 TABLET ORAL at 09:00

## 2017-08-16 RX ADMIN — PREGABALIN SCH MG: 75 CAPSULE ORAL at 09:00

## 2017-08-16 RX ADMIN — MORPHINE SULFATE PRN MG: 4 INJECTION, SOLUTION INTRAMUSCULAR; INTRAVENOUS at 06:31

## 2017-08-16 RX ADMIN — LEVOTHYROXINE SODIUM SCH MCG: 125 TABLET ORAL at 05:26

## 2017-08-16 NOTE — RAD
Radionuclide gastric emptying study, 8/16/2017:



History: Abdominal pain, nausea and vomiting



The study was performed utilizing a solid test meal radiolabeled with 2 mCi of

technetium 99m sulfur colloid. Imaging was performed out to 1 hour. The time

to half emptying of the test meal from the patient's stomach was estimated at

159 minutes. A normal T1/2 is 60 minutes +/- 30 minutes.



IMPRESSION: Moderately delayed gastric emptying

## 2017-08-16 NOTE — PDOC
Subjective:


Subjective:


Does not feel ready to DC, still has pain.





Objective:


Objective:


Per RN - eating a lot of popsicles.  No vomiting or bleeding.  Wants morphine Q 

2 hrs.


Vital Signs:





 Vital Signs








  Date Time  Temp Pulse Resp B/P (MAP) Pulse Ox O2 Delivery O2 Flow Rate FiO2


 


8/16/17 11:32      Room Air  


 


8/16/17 09:00       2.0 


 


8/16/17 07:01     96   


 


8/16/17 07:00 98.1 89 17 103/51 (68)    





 98.1       








Labs:





Laboratory Tests








Test


  8/16/17


07:10


 


Hemoglobin 11.0 g/dL 


 


Hematocrit 32.9 % 


 


Mean Corpuscular Hemoglobin


Concent 33 g/dL 


 








Imaging:


GES 8/16/17


PENDING





PE:





GEN: NAD, talking on cell phone, eating grape popsicle


LUNGS: CTAB anteriorly


HEART: RRR


ABD: BS+, epigastric tenderness - mild, no guarding


NEURO/PSYCH: A & O 3





A/P:


Upper abd pain


Coffee-ground emesis - no recurrence


Normocytic anemia - Hgb stable (10.9 to 11)


H/o PUD, recent EGD w/ erosive gastritis, on PPI BID


Lupus - on prednisone, neuro following, doubt lupus cerebritis





--


Appears improved today.


GES report pending although appears to have some delay.  Discussed 

gastroparesis diet (small, frequent meals).  She has taken Reglan before w/o 

adverse effects - is an option for PRN use (QIDAC), not ideal long-term.


Continue PPI and NSAID avoidance.


ADAT, look toward DC.


Establish PCP and rheum. as outpt.











NAHUN REED Aug 16, 2017 12:13

## 2017-08-16 NOTE — PDOC3
Discharge Summary


Visit Information


Date of Admission:  Aug 15, 2017


Date of Discharge:  Aug 16, 2017


Admitting Diagnosis Comment:


1. GAstritis, non compliant with PPI BID and carafate TID


2. NOrmocytic anemia - did drop to 10 from 12 on dc last June


3. NArc addiction


Final Diagnosis


Problems


Medical Problems:


(1) Abdominal pain


Status: Acute  





(2) Gastritis


Status: Acute  











Brief Hospital Course


Allergies





 Allergies








Coded Allergies Type Severity Reaction Last Updated Verified


 


  gabapentin Allergy Intermediate  6/12/17 Yes


 


  NSAIDS (Non-Steroidal Anti-Inflamma Allergy Mild Nausea and Vomiting 6/12/17 

Yes








Vital Signs





Vital Signs








  Date Time  Temp Pulse Resp B/P (MAP) Pulse Ox O2 Delivery O2 Flow Rate FiO2


 


8/16/17 12:40      Room Air  


 


8/16/17 11:00 98.4 78 18 100/55 (70) 98  2.0 





 98.4       








Lab Results





Laboratory Tests








Test


  8/15/17


03:15 8/15/17


04:00 8/15/17


04:05 8/16/17


07:10


 


Stool Occult Blood Negative (NEG)    


 


Erythrocyte Sedimentation Rate  60 (0-25)   


 


White Blood Count


  


  


  8.2 x10^3/uL


(4.0-11.0) 


 


 


Red Blood Count


  


  


  3.58 x10^6/uL


(3.50-5.40) 


 


 


Hemoglobin


  


  


  10.9 g/dL


(12.0-15.5) 11.0 g/dL


(12.0-15.5)


 


Hematocrit


  


  


  32.6 %


(36.0-47.0) 32.9 %


(36.0-47.0)


 


Mean Corpuscular Volume   91 fL ()  


 


Mean Corpuscular Hemoglobin   31 pg (25-35)  


 


Mean Corpuscular Hemoglobin


Concent 


  


  34 g/dL


(31-37) 33 g/dL


(31-37)


 


Red Cell Distribution Width


  


  


  14.5 %


(11.5-14.5) 


 


 


Platelet Count


  


  


  216 x10^3/uL


(140-400) 


 


 


Neutrophils (%) (Auto)   61 % (31-73)  


 


Lymphocytes (%) (Auto)   30 % (24-48)  


 


Monocytes (%) (Auto)   8 % (0-9)  


 


Eosinophils (%) (Auto)   0 % (0-3)  


 


Basophils (%) (Auto)   1 % (0-3)  


 


Neutrophils # (Auto)


  


  


  5.0 x10^3uL


(1.8-7.7) 


 


 


Lymphocytes # (Auto)


  


  


  2.5 x10^3/uL


(1.0-4.8) 


 


 


Monocytes # (Auto)


  


  


  0.6 x10^3/uL


(0.0-1.1) 


 


 


Eosinophils # (Auto)


  


  


  0.0 x10^3/uL


(0.0-0.7) 


 


 


Basophils # (Auto)


  


  


  0.0 x10^3/uL


(0.0-0.2) 


 


 


Prothrombin Time


  


  


  12.5 SEC


(11.7-14.0) 


 


 


Prothromb Time International


Ratio 


  


  1.0 (0.8-1.1) 


  


 


 


Activated Partial


Thromboplast Time 


  


  30 SEC (24-38) 


  


 


 


Sodium Level


  


  


  142 mmol/L


(136-145) 


 


 


Potassium Level


  


  


  3.9 mmol/L


(3.5-5.1) 


 


 


Chloride Level


  


  


  105 mmol/L


() 


 


 


Carbon Dioxide Level


  


  


  28 mmol/L


(21-32) 


 


 


Anion Gap   9 (6-14)  


 


Blood Urea Nitrogen


  


  


  25 mg/dL


(7-20) 


 


 


Creatinine


  


  


  0.7 mg/dL


(0.6-1.0) 


 


 


Estimated GFR


(Cockcroft-Gault) 


  


  86.2 


  


 


 


Glucose Level


  


  


  100 mg/dL


(70-99) 


 


 


Calcium Level


  


  


  8.7 mg/dL


(8.5-10.1) 


 


 


Total Bilirubin


  


  


  0.1 mg/dL


(0.2-1.0) 


 


 


Direct Bilirubin


  


  


  < 0.1 mg/dL


(0.0-0.2) 


 


 


Aspartate Amino Transf


(AST/SGOT) 


  


  10 U/L (15-37) 


  


 


 


Alanine Aminotransferase


(ALT/SGPT) 


  


  17 U/L (14-59) 


  


 


 


Alkaline Phosphatase


  


  


  97 U/L


() 


 


 


Total Protein


  


  


  6.8 g/dL


(6.4-8.2) 


 


 


Albumin


  


  


  3.3 g/dL


(3.4-5.0) 


 


 


Lipase


  


  


  140 U/L


() 


 


 


Thyroid Stimulating Hormone


(TSH) 


  


  0.120 uIU/mL


(0.358-3.74) 


 








Laboratory Tests








Test


  8/16/17


07:10


 


Hemoglobin


  11.0 g/dL


(12.0-15.5)


 


Hematocrit


  32.9 %


(36.0-47.0)


 


Mean Corpuscular Hemoglobin


Concent 33 g/dL


(31-37)








Brief Hospital Course


Ms. Bunch  is a 57 old [sex] who presented with [ ]


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 

claims of coffee ground emesis, HGb 10 on admit, a drop from 12 when she was 

dcd. She was admitted here June for the same, EGD done showed gastritis, She 

claims she takes her PPI but I did ask her details (how frequent, carafate etc- 

which she was dcd on ) and she could not tell me these, Hence I doubt her 

compliance, I have had 5 calls at least just in AM from the RN for  multiple 

calls re pain meds, she has visited RN station maybe 10 times now for pain meds.


I told her I will not do dilaudid as this will not solve her gastritis.


Did discuss with Gi NP, could see as OP but they can also see inpt if she stays.


Will rpt HH sina, make sure not dropping, If stable, home sina


I did consult GI.





LAst abd ct was June 20, showed:


IMPRESSION:


 


1. No evidence of bowel obstruction or hydronephrosis.


 


 


2. Cystic lesion left kidney.


 


 


3. Questionable region of nipple retraction on the right. This could be 


positional in nature but if there is physical exam finding of nipple 


retraction would consider obtaining a follow-up mammogram and/or 


ultrasound if one has not been obtained recently.








COURSE; Hgb upon dc 11. no coffee grounds, no rpt egd in order,. Cleared from 

GI. Difficult dc,. Wants iV narcs, but fast asleep, 


Recommenede to follow the PPI and carafate,\


High likelihood of going back to er, bUT MUST NOT ADMIT UNLESS THERE IS 

COMPELLING REASON (not just IV pain meds)





GET slight delay, i rxd reglan





Discharge Information


Condition at Discharge:  Improved, Stable


Disposition/Orders:  D/C to Home


Scheduled


Alprazolam (Xanax), 1 TAB PO TID, (Reported)


Levothyroxine Sodium (Levothyroxine Sodium), 1 TAB PO DAILY, (Reported)


Pantoprazole Sodium (Pantoprazole Sodium), 40 MG PO BIDAC


Pregabalin (Lyrica), 1 CAP PO BID, (Reported)


Sucralfate (Sucralfate), 1 TAB PO TID


Tramadol Hcl (Tramadol Hcl), 1 TAB PO BID, (Reported)


Trazodone Hcl (Trazodone Hcl), 1 TAB PO QHS, (Reported)


Venlafaxine Hcl (Effexor Xr), 300 MG PO DAILY, (Reported)


Zolpidem Tartrate (Ambien), 1 TAB PO QHS, (Reported)





Scheduled PRN


Alprazolam (Alprazolam), 1 MG PO PRN Q6HRS PRN for ANXIETY / AGITATION, (

Reported)


Cyclobenzaprine Hcl (Cyclobenzaprine Hcl), 10 MG PO PRN Q6HRS PRN for MUSCLE 

SPASMS


Oxycodone Hcl/Acetaminophen (Oxycodone-Acetaminophen 5-325), 2 TAB PO PRN Q4HRS 

PRN for MODERATE TO SEVERE PAIN


[Oxycodone Hcl/Acetaminophen], 1 TAB PO PRN Q4HRS PRN for MODERATE PAIN





Miscellaneous Medications


Carbamazepine (Tegretol), 200 MG PO, (Reported)











CECILIA ZARATE MD Aug 16, 2017 14:56

## 2017-08-16 NOTE — PDOC
PROGRESS NOTES


Assessment


Assessment


Lupus cognitive impairment.


Psychiatric issues.


GI bleeding.


Erosive gastritis.


Neuropathy in LE.


Anxiety.


Hypothyroidism.


Hyperthyroidism, meds, TSH 0.12, low


Narcotics dependence.


Doubt lupus cerebritis at the present time.





RECOMMENDATIONS/PLAN:


Treat medical diseases.


Please make adjustment of thyroxin per floor team.





EEG on 17: WNL.


Lab: [facility] brewster.





HISTORY OF THE PRESENT ILLNESS:


57-y-old  female patient with Hx of lupus that was diagnosed about 4 

years ago. She said she has been taking non-steroids antiinflammatory 

medications and has erosive gastritis. She has nausea and vomiting brown 

colored gastric content to be admitted into the hospital. She was observed some 

behavior changes so Neurology was called for consultation.





Past Medical History


Cardiovascular:  No pertinent hx


Pulmonary:  No pertinent hx


CENTRAL NERVOUS SYSTEM:  Periperal neuropathy


GI:  Peptic Ulcer disease


Heme/Onc:  Other


Hepatobiliary:  No pertinent hx


Psych:  Anxiety


Musculoskeletal:  Osteoarthritis


Rheumatologic:  No pertinent hx


Infectious disease:  No pertinent hx


Renal/:  No pertinent hx


Endocrine:  Hypothyroidism





Past Surgical History


Appendectomy, Arthroscopy, Cholecystectomy, , Hysterectomy, Other





Family History


Hypertension





Social History


Smoke:  No


ALCOHOL:  rare


Drugs:  None





ALLERGY:


Reviewed.





MEDICATIONS:


Refer to MAR





REVIEW OF SYSTEMS:


Constitutional: No malnutrition, weight loss, cachexia.


Head: No traumatic brain or head injury.


Skin: No edema, or rash.


Ear: No infection.


Eyes: No vision loss or color blindness.


Nose: No bleeding or purulent discharges.


Hearing: No hearing decrease.


Neck: No injury.


Breast: No history of cancer, masses,or discharges.


Cardiac: No MI, arrhythmia.


Pulmonary: No COPD.


GI: GI bleeding this time.


Urinary/genital: UTI.


Endocrinologic: Obesity.


Skeletomuscular: No muscular atrophy, deformity.


Neurological: see  HP.


Psychiatric: Denies drug use/abuse.


Otherwise, not pertinant14-point review of systems.





PHYSICAL EXAMINATION:   


General appearance is talkative..  


HEENT:  Normocephalic and nontraumatic.  Eyes, nose, ears, and throat are 

unremarkable.  


Neck is supple. No lymphadenopathy. No bruits are heard over the carotid 

artery.  No crepitus. 


Cardiovascular:  S1, S2, regular rate and rhythm.  


Pulmonary:  Clear to auscultation bilaterally. 


Abdomen:  Bowel sounds are positive.    


Extremities:  No rash, lesions, or edema.  


No restriction of range of motion





NEUROLOGICAL  EXAMINATION:


Alert 


Oriented to time, place and person.


PERRL.


EOMI.


CN: no focal findings.


Muscle tone: within normal.


Muscle strength: 5


DTR: 2


Plantar reflex: Flexor response bilaterally 


Gait: not examined in bed. 


Sensory exam: no abnormal findings.


No cerebellar signs elicited.


F-T-N test fine.





Objective


Objective





Vital Signs








  Date Time  Temp Pulse Resp B/P (MAP) Pulse Ox O2 Delivery O2 Flow Rate FiO2


 


17 16:40      Room Air  


 


17 11:00 98.4 78 18 100/55 (70) 98  2.0 





 98.4       














Intake and Output 


 


 17





 07:00


 


Intake Total 2000 ml


 


Balance 2000 ml


 


 


 


Intake Oral 2000 ml


 


# Voids 3











Vitals Signs


Vitals





 VS - Last 72 Hours, by Label








  Date Time  Temp Pulse Resp B/P (MAP) Pulse Ox O2 Delivery O2 Flow Rate FiO2


 


17 16:40      Room Air  


 


17 15:09      Room Air  


 


17 12:40      Room Air  


 


17 11:32      Room Air  


 


17 11:00 98.4 78 18 100/55 (70) 98 Nasal Cannula 2.0 





 98.4       


 


17 09:00      Nasal Cannula 2.0 


 


17 07:49      Nasal Cannula 2.0 


 


17 07:01     96   


 


17 07:00 98.1 89 17 103/51 (68) 94 Nasal Cannula 2.0 





 98.1       


 


17 06:31   18  96 Room Air  


 


17 03:09 97.4 100  118/77 (91) 96 Nasal Cannula  





 97.4       


 


17 03:04 97.4 100 20 118/77 (91) 91 Nasal Cannula  





 97.4       


 


8/15/17 23:07   19  93 Room Air  


 


8/15/17 23:00 97.9 87 20 114/68 (83) 93 Room Air  





 97.9       


 


8/15/17 22:37   17     


 


8/15/17 22:07     96 Room Air  


 


8/15/17 20:00      Room Air  


 


8/15/17 19:40   18  96 Room Air  


 


8/15/17 19:00 96.4 90 20 137/73 (94) 95 Room Air  





 96.4       


 


8/15/17 17:06     96 Room Air  


 


8/15/17 15:25 97.7 77 19 114/81 (92) 96 Room Air  





 97.7       


 


8/15/17 14:42     96 Room Air  


 


8/15/17 12:58     96 Room Air  


 


8/15/17 12:26     96 Room Air  


 


8/15/17 11:38      Room Air  


 


8/15/17 11:31     96 Room Air  


 


8/15/17 10:55 97.2 64 18 119/71 (87) 96 Room Air  





 97.2       


 


8/15/17 10:23     95   


 


8/15/17 08:21     95 Room Air  


 


8/15/17 07:50 97.5 72 18 131/61 (84) 95 Room Air  





 97.5       


 


8/15/17 07:49 97.5 72 18 131/61 (84) 95 Room Air  





 97.5       











Laboratory


Laboratory





Laboratory Tests








Test


  17


07:10


 


Hemoglobin


  11.0 g/dL


(12.0-15.5)


 


Hematocrit


  32.9 %


(36.0-47.0)


 


Mean Corpuscular Hemoglobin


Concent 33 g/dL


(31-37)











Medication


Medications





Current Medications


Prednisone (Prednisone) 20 mg DAILY PO  Last administered on 8/15/17at 18:26;  

Start 8/15/17 at 18:00;  Stop 17 at 17:00;  Status DC


Trazodone HCl (Desyrel) 300 mg QHS PO  Last administered on 8/15/17at 22:08;  

Start 8/15/17 at 21:00;  Stop 17 at 17:00;  Status DC


Zolpidem Tartrate (Ambien) 5 mg QHS PO  Last administered on 8/15/17at 22:08;  

Start 8/15/17 at 21:00;  Stop 17 at 17:00;  Status DC





Comment


Review of Relevant


I have reviewed the following items wyatt (where applicable) has been applied.











GABRIELLE ACKERMAN MD Aug 16, 2017 17:12

## 2017-08-18 LAB — ANA HEP SCREEN: (no result)

## 2017-09-02 ENCOUNTER — HOSPITAL ENCOUNTER (EMERGENCY)
Dept: HOSPITAL 61 - ER | Age: 57
LOS: 1 days | Discharge: HOME | End: 2017-09-03
Payer: MEDICARE

## 2017-09-02 VITALS — DIASTOLIC BLOOD PRESSURE: 77 MMHG | SYSTOLIC BLOOD PRESSURE: 124 MMHG

## 2017-09-02 VITALS — BODY MASS INDEX: 31.78 KG/M2 | HEIGHT: 70 IN | WEIGHT: 222 LBS

## 2017-09-02 DIAGNOSIS — Z88.8: ICD-10-CM

## 2017-09-02 DIAGNOSIS — Z90.49: ICD-10-CM

## 2017-09-02 DIAGNOSIS — Z88.6: ICD-10-CM
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
## 2017-09-02 DIAGNOSIS — J45.909: ICD-10-CM

## 2017-09-02 DIAGNOSIS — M32.9: ICD-10-CM
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
## 2017-09-02 DIAGNOSIS — Z90.710: ICD-10-CM

## 2017-09-02 DIAGNOSIS — S42.292A: Primary | ICD-10-CM

## 2017-09-02 PROCEDURE — 29105 APPLICATION LONG ARM SPLINT: CPT

## 2017-09-02 PROCEDURE — 99284 EMERGENCY DEPT VISIT MOD MDM: CPT

## 2017-09-02 PROCEDURE — 96372 THER/PROPH/DIAG INJ SC/IM: CPT

## 2017-09-02 PROCEDURE — 73030 X-RAY EXAM OF SHOULDER: CPT

## 2017-09-03 NOTE — RAD
Examination: 2 views of the left shoulder



History: History of left shoulder injury, pain



Comparison: None



Findings:



Humerus head appears to be within the glenoid. Large osteophyte formation

identified in the inferior aspect of the humerus. The distal clavicle is

translated superiorly with prior changes of  distal clavicle osteolysis or

postsurgical changes. Lucency identified in the posterior aspect of the

humerus head seen on the scapular Y view.



Impression:



1. Lucency identified in the posterior aspect of the humeral head seen on the

scapular Y view , could be fracture or secondary to a large osteophyte.



2. Severe degenerative changes in the glenohumeral joint.



3. The distal clavicle is translated superiorly with prior changes of  distal

clavicle osteolysis or postsurgical changes.

## 2017-09-03 NOTE — PHYS DOC
Past Medical History


Past Medical History:  Anxiety, Asthma, Depression, Pneumonia, P.U.D., Other


Additional Past Medical Histor:  Lupus; Erosive Gastritis


Past Surgical History:  Appendectomy, Cholecystectomy, , Hysterectomy, 

Other


Additional Past Surgical Histo:  breast implants and removal, rt ankle


Alcohol Use:  None


Drug Use:  None





Adult General


Chief Complaint


Chief Complaint:  SHOULDER INJURY





HPI


HPI





Patient is a 57  year old female who presents with complaint of left shoulder 

pain after suffering a fall at home. Patient states that she was spraying air 

freshener in her bathroom and states that she accidentally slipped on the tile 

floor where the air freshener had settled, falling onto her left shoulder. 

Patient states that she has not been able to move her shoulder due to severe 

pain. Patient states that it is 10 out of 10 with any movement. The patient 

states that she has had to keep her arm flexed and rotated and to keep it from 

hurting. Patient states that she has had a prior surgery on her left shoulder 

but is unable to tell me what exactly was done at that time. Patient denies 

hitting her head or losing consciousness and denies any other injuries at this 

time. Patient has not taken any medications to help with symptoms.





Review of Systems


Review of Systems





Constitutional: Denies fever or chills []


Eyes: Denies change in visual acuity, redness, or eye pain []


HENT: Denies nasal congestion or sore throat []


Respiratory: Denies cough or shortness of breath []


Cardiovascular: No additional information not addressed in HPI []


GI: Denies abdominal pain, nausea, vomiting, bloody stools or diarrhea []


: Denies dysuria or hematuria []


Musculoskeletal: Left shoulder pain[]


Integument: Denies rash or skin lesions []


Neurologic: Denies headache, focal weakness or sensory changes []





Current Medications


Current Medications





Current Medications








 Medications


  (Trade)  Dose


 Ordered  [facility]  Start Time


 Stop Time Status Last Admin


Dose Admin


 


 Morphine Sulfate  4 mg  1X  ONCE  9/3/17 00:45


 9/3/17 00:46 UNV  


 


 


 Ondansetron HCl


  (Zofran Odt)  4 mg  1X  ONCE  9/3/17 00:45


 9/3/17 00:46 UNV  


 


 


 Ondansetron HCl


  (Zofran)  4 mg  1X  ONCE  9/3/17 00:30


 9/3/17 00:31 Cancel  


 


 


 Sodium Chloride  1,000 ml @ 


 100 mls/hr  Q10H  9/3/17 00:30


 9/3/17 10:29 Cancel  


 











Allergies


Allergies





Allergies








Coded Allergies Type Severity Reaction Last Updated Verified


 


  gabapentin Allergy Intermediate  17 Yes


 


  NSAIDS (Non-Steroidal Anti-Inflamma Allergy Mild Nausea and Vomiting 17 

Yes











Physical Exam


Physical Exam





Constitutional: Alert, afebrile, appears in moderate to severe discomfort. []


HENT: Normocephalic, atraumatic, bilateral external ears normal, oropharynx 

moist, no oral exudates, nose normal. []


Eyes: PERRLA, EOMI, conjunctiva normal, no discharge. [] 


Neck: Normal range of motion, no tenderness, supple, no stridor. [] 


Cardiovascular:Heart rate regular rhythm, no murmur []


Lungs & Thorax:  Bilateral breath sounds clear to auscultation []


Abdomen: Bowel sounds normal, soft, no tenderness, no masses, no pulsatile 

masses. [] 


Skin: Warm, dry, no erythema, no rash. [] 


Back: No tenderness, no CVA tenderness. [] 


Extremities: Left upper extremity is held flexed and internally rotated, 

tenderness to palpation over left humeral head, range of motion and left 

shoulder not tested secondary to pain, sensation and capillary refill normal in 

left hand. [] 


Neurologic: Alert and oriented X 3, normal motor function, normal sensory 

function, no focal deficits noted. []





Current Patient Data


Vital Signs





 Vital Signs








  Date Time  Temp Pulse Resp B/P (MAP) Pulse Ox O2 Delivery O2 Flow Rate FiO2


 


17 23:55 99.7 84 18 124/77 (93) 96 Room Air  





 99.7       











EKG


EKG


Not performed[]





Radiology/Procedures


Radiology/Procedures


3 view left shoulder series interpreted by me: Minimally displaced left humeral 

head fracture, no dislocation, chronic AC joint separation compared to previous 

x-ray





Course & Med Decision Making


Course & Med Decision Making


Pertinent Labs and Imaging studies reviewed. (See chart for details)





Patient was given IM morphine in the emergency department to help with pain. 

The patient was placed in a left shoulder immobilizer by the emergency 

department nurse. My evaluation post immobilizer application showed normal 

capillary refill and normal sensation in all 5 digits of the left hand. Patient 

will be referred to Dr. Gaxiola for follow-up in 5-7 days for reevaluation. 

Patient prescribed Norco for pain. Advised return emergency department for any 

worsening symptoms. Patient voiced understanding and in agreement with 

treatment plan.





Dragon Disclaimer


Dragon Disclaimer


This electronic medical record was generated, in whole or in part, using a 

voice recognition dictation system.





Departure


Departure


Impression:  


 Primary Impression:  


 Humeral head fracture


Disposition:   HOME, SELF-CARE


Condition:  IMPROVED


Referrals:  


NO PCP (PCP)








GAYLE GAXIOLA MD


Patient Instructions:  Shoulder Fracture (Proximal Humerus or Glenoid)-[facility]





Additional Instructions:  


Follow-up with Dr. Gaixola in 5-7 days for reevaluation. Return to the 

emergency department for any worsening symptoms.


Scripts


Hydrocodone/Apap 5-325 (NORCO 5-325 TABLET) 1 Each Tablet


1-2 TAB PO Q4-6HRS Y for PAIN, #40 TAB


   Prov: JET TAY MD         9/3/17





Problem Qualifiers








 Primary Impression:  


 Humeral head fracture


 Encounter type:  initial encounter  Fracture type:  closed  Laterality:  left  

Qualified Codes:  S42.292A - Other displaced fracture of upper end of left 

humerus, initial encounter for closed fracture








JET TAY MD Sep 3, 2017 00:50

## 2017-09-12 ENCOUNTER — HOSPITAL ENCOUNTER (EMERGENCY)
Dept: HOSPITAL 61 - ER | Age: 57
LOS: 1 days | Discharge: HOME | End: 2017-09-13
Payer: MEDICARE

## 2017-09-12 DIAGNOSIS — M25.512: Primary | ICD-10-CM

## 2017-09-12 DIAGNOSIS — Z88.6: ICD-10-CM

## 2017-09-12 DIAGNOSIS — Z88.8: ICD-10-CM

## 2017-09-12 DIAGNOSIS — F41.9: ICD-10-CM

## 2017-09-12 DIAGNOSIS — J45.909: ICD-10-CM

## 2017-09-12 DIAGNOSIS — F32.9: ICD-10-CM

## 2017-09-12 PROCEDURE — 99283 EMERGENCY DEPT VISIT LOW MDM: CPT

## 2017-09-13 VITALS — SYSTOLIC BLOOD PRESSURE: 135 MMHG | DIASTOLIC BLOOD PRESSURE: 84 MMHG

## 2017-09-13 NOTE — PHYS DOC
Past Medical History


Past Medical History:  Anxiety, Asthma, Depression, Pneumonia, P.U.D., Other


Additional Past Medical Histor:  Lupus; Erosive Gastritis


Past Surgical History:  Appendectomy, Cholecystectomy, , Hysterectomy, 

Other


Additional Past Surgical Histo:  breast implants and removal, rt ankle


Alcohol Use:  None


Drug Use:  None





Adult General


Chief Complaint


Chief Complaint:  SHOULDER INJURY





[facility]


HPI





Patient is a 57  year old female with history of depression and anxiety who 

presents today with increased left shoulder pain that began September 3, 2017 

after she fell and broke her humerus. She states she called the orthopedic 

doctor and they have not returned her call. Patient denies any new injuries. 

She states occasionally her left fingers go numb. She states she ran out of her 

hydrocodone.





Review of Systems


Review of Systems





Constitutional: Denies fever or chills []


Eyes: Denies change in visual acuity, redness, or eye pain []


HENT: Denies nasal congestion or sore throat []


Respiratory: Denies cough or shortness of breath []


Cardiovascular: No additional information not addressed in HPI []


GI: Denies abdominal pain, nausea, vomiting, bloody stools or diarrhea []


: Denies dysuria or hematuria []


Musculoskeletal: Left shoulder pain


Integument: Denies rash or skin lesions []


Neurologic: Denies headache, focal weakness or sensory changes []





Allergies


Allergies





Allergies








Coded Allergies Type Severity Reaction Last Updated Verified


 


  gabapentin Allergy Intermediate  17 Yes


 


  NSAIDS (Non-Steroidal Anti-Inflamma Allergy Mild Nausea and Vomiting 17 

Yes











Physical Exam


Physical Exam





Constitutional: Well developed, well nourished, no acute distress, non-toxic 

appearance. []


HENT: Normocephalic, atraumatic, bilateral external ears normal, oropharynx 

moist, no oral exudates, nose normal. []


Eyes: PERRLA, EOMI, conjunctiva normal, no discharge. [] 


Neck: Normal range of motion, no tenderness, supple, no stridor. [] 


Cardiovascular:Heart rate regular rhythm, no murmur []


Lungs & Thorax:  Bilateral breath sounds clear to auscultation []


Abdomen: Bowel sounds normal, soft, no tenderness, no masses, no pulsatile 

masses. [] 


Skin: Warm, dry, no erythema, no rash. [] 


Back: No tenderness, no CVA tenderness. [] 


Extremities: Left shoulder with no obvious deformity. Tenderness on palpation 

throughout the left shoulder joint as well as proximal humerus. Very limited 

range of motion to the left shoulder especially abduction past 10 due to pain. 

+2 left radial pulse. Adequate ulnar median radius sensation to the left 

forearm. Cap refill less than 2 seconds the left fingers.


Neurologic: Alert and oriented X 3, normal motor function, normal sensory 

function, no focal deficits noted. []


Psychologic: Affect normal, judgement normal, mood normal. []





EKG


EKG


[]





Radiology/Procedures


Radiology/Procedures


[]





Course & Med Decision Making


Course & Med Decision Making


Pertinent Labs and Imaging studies reviewed. (See chart for details)





Patient is in the ED with increased left shoulder pain from left humerus 

fracture she sustained in September 3, 2017. She no longer has the sling which 

was provided to her on her previous visit. The sling was offered to her applied 

by the ED tech, neurovascular exam is intact. Recommended she contact the 

orthopedic doctor tomorrow to set up a follow-up appointment. Provided 

prescription for hydrocodone.





Dragon Disclaimer


Dragon Disclaimer


This electronic medical record was generated, in whole or in part, using a 

voice recognition dictation system.





Departure


Departure


Impression:  


 Primary Impression:  


 Shoulder pain, left


Disposition:  01 HOME, SELF-CARE


Condition:  STABLE


Referrals:  


NO PCP (PCP)








GAYLE GARCIA MD


call his office tomorrow for a follow up appointment


Patient Instructions:  Shoulder Pain, Easy-to-Read





Additional Instructions:  


You were seen with increased left shoulder pain from an injury you had Sept 3 

2017. Please make sure you call the orthopedic doctor tomorrow morning and set 

up a follow-up appointment. Ice and elevate the extremity as tolerated.


Scripts


Hydrocodone/Apap 5-325 (NORCO 5-325 TABLET) 1 Each Tablet


1-2 TAB PO Q4-6HRS, #20 TAB


   Prov: JIMMY MCFADDEN BILLY         17





Problem Qualifiers








 Primary Impression:  


 Shoulder pain, left


 Chronicity:  acute  Qualified Codes:  M25.512 - Pain in left shoulder








DANGELOJIMMY HERNANDEZ BILLY Sep 13, 2017 00:30

## 2017-10-14 ENCOUNTER — HOSPITAL ENCOUNTER (EMERGENCY)
Dept: HOSPITAL 61 - ER | Age: 57
Discharge: HOME | End: 2017-10-14
Payer: MEDICARE

## 2017-10-14 VITALS — SYSTOLIC BLOOD PRESSURE: 126 MMHG | DIASTOLIC BLOOD PRESSURE: 57 MMHG

## 2017-10-14 VITALS — WEIGHT: 223 LBS | HEIGHT: 70 IN | BODY MASS INDEX: 31.92 KG/M2
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
## 2017-10-14 DIAGNOSIS — J45.909: ICD-10-CM
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
## 2017-10-14 DIAGNOSIS — M25.512: Primary | ICD-10-CM

## 2017-10-14 DIAGNOSIS — Z88.8: ICD-10-CM

## 2017-10-14 DIAGNOSIS — Z87.11: ICD-10-CM

## 2017-10-14 DIAGNOSIS — Z88.6: ICD-10-CM
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
## 2017-10-14 PROCEDURE — 96372 THER/PROPH/DIAG INJ SC/IM: CPT

## 2017-10-14 PROCEDURE — 99284 EMERGENCY DEPT VISIT MOD MDM: CPT

## 2017-10-14 PROCEDURE — 73060 X-RAY EXAM OF HUMERUS: CPT

## 2017-10-14 NOTE — PHYS DOC
Past Medical History


Past Medical History:  Anxiety, Asthma, Depression, Pneumonia, P.U.D., Other


Additional Past Medical Histor:  Lupus; Erosive Gastritis


Past Surgical History:  Appendectomy, Cholecystectomy, , Hysterectomy, 

Other


Additional Past Surgical Histo:  breast implants and removal, rt ankle


Alcohol Use:  None


Drug Use:  None





Adult General


Chief Complaint


Chief Complaint:  MECHANICAL FALL





HPI


HPI





Patient is a 57  year old female who presents with complaint of left shoulder 

pain. Patient states that she was walking her dog shortly prior to arrival when 

she accidentally tripped in a pothole in the yard and fell onto her left 

shoulder. The patient was seen in the emergency department twice over the last 

month, initially on September 3, 2017. X-rays at that time suggested possible 

acute humeral head fracture. Patient was treated with a sling. The patient was 

seen in the emergency department again on 2017 with complaints of 

continued shoulder pain. At that time it was noted that the patient stated she 

had not followed up with orthopedic surgery. The patient however states that 

after her second visit she did follow-up in Dr. Gaxiola's office. Patient 

states that she was diagnosed with a rotator cuff injury and that she may need 

to be set up for surgery in the near future. Patient states that she was 

starting to feel better until she had her accidental fall tonight. Patient 

states that since her fall she has noted numbness going into the fingers of her 

left hand however she is currently able to move them. Patient states that she 

is unable to move her left arm, however she notes that she is having 

significant pain which may be limiting her ability to move it. Patient rates 

her pain as 10 out of 10. Patient has not taken any medications for her 

symptoms since her fall.





Review of Systems


Review of Systems





Constitutional: Denies fever or chills []


Eyes: Denies change in visual acuity, redness, or eye pain []


HENT: Denies nasal congestion or sore throat []


Respiratory: Denies cough or shortness of breath []


Cardiovascular: Denies chest pain or edema[]


GI: Denies abdominal pain, nausea, vomiting, bloody stools or diarrhea []


: Denies dysuria or hematuria []


Musculoskeletal: Left shoulder and upper arm pain[]


Integument: Denies rash or skin lesions []


Neurologic: Denies headache, numbness in left hand[]





Current Medications


Current Medications





Current Medications








 Medications


  (Trade)  Dose


 Ordered  Sig/Sandy  Start Time


 Stop Time Status Last Admin


Dose Admin


 


 Morphine Sulfate  4 mg  1X  ONCE  10/14/17 03:15


 10/14/17 03:16 DC 10/14/17 03:05


4 MG


 


 Ondansetron HCl


  (Zofran Odt)  4 mg  1X  ONCE  10/14/17 03:15


 10/14/17 03:16 DC 10/14/17 03:05


4 MG











Allergies


Allergies





Allergies








Coded Allergies Type Severity Reaction Last Updated Verified


 


  gabapentin Allergy Intermediate  17 Yes


 


  NSAIDS (Non-Steroidal Anti-Inflamma Allergy Mild Nausea and Vomiting 17 

Yes











Physical Exam


Physical Exam





Constitutional: Alert, afebrile, appears in moderate discomfort. []


HENT: Normocephalic, atraumatic, bilateral external ears normal, oropharynx 

moist, no oral exudates, nose normal. []


Eyes: PERRLA, EOMI, conjunctiva normal, no discharge. [] 


Neck: Normal range of motion, no tenderness, supple, no stridor. [] 


Cardiovascular:Heart rate regular rhythm, no murmur []


Lungs & Thorax:  Bilateral breath sounds clear to auscultation []


Abdomen: Bowel sounds normal, soft, no tenderness, no masses, no pulsatile 

masses. [] 


Skin: Warm, dry, no erythema, no rash. [] 


Back: No tenderness, no CVA tenderness. [] 


Extremities: Left upper extremity held flexed and internally rotated, anterior 

and lateral left shoulder tenderness to palpation, range of motion not tested 

secondary to pain, capillary refill less than 2 seconds in all 5 digits of left 

hand, normal sensation. [] 


Neurologic: Alert and oriented X 3, normal motor function, sensation is normal 

in left hand to light touch and deep pressure. []





Current Patient Data


Vital Signs





 Vital Signs








  Date Time  Temp Pulse Resp B/P (MAP) Pulse Ox O2 Delivery O2 Flow Rate FiO2


 


10/14/17 02:27 98.5 77 12  96 Room Air  





 98.5       











EKG


EKG


Not performed[]





Radiology/Procedures


Radiology/Procedures


Left humerus x-ray, 2 views, interpreted by me: No acute fracture, normal 

alignment, normal soft tissue[]





Course & Med Decision Making


Course & Med Decision Making


Pertinent Labs and Imaging studies reviewed. (See chart for details)





Patient's x-rays were negative for acute fracture. This is the patient's third 

visit for complaints of shoulder pain in the last 40 days. I'm starting to have 

suspicion that the patient may be displaying drug seeking behavior. The patient 

was given a one-time dose of morphine and oral Zofran in the emergency 

department for acute management of pain. Due to lack of evidence of acute 

fracture, I do not feel that additional narcotic pain prescriptions are 

indicated. I advised that the patient continue with oral Tylenol and her home 

medications for treatment of pain. Advised use of sling for comfort with 

recommended gentle range of motion as tolerated with the left upper extremity. 

Recommended follow-up with the patient's primary doctor in the next 2-3 days 

for reevaluation and discuss any further need for narcotic pain medications. 

Advised return emergency department for any worsening symptoms. Patient voiced 

understanding and in agreement with treatment plan.





Dragon Disclaimer


Dragon Disclaimer


This electronic medical record was generated, in whole or in part, using a 

voice recognition dictation system.





Departure


Departure


Impression:  


 Primary Impression:  


 Shoulder pain, left


Disposition:  01 HOME, SELF-CARE


Condition:  STABLE


Referrals:  


NO PCP (PCP)


Patient Instructions:  Shoulder Pain





Additional Instructions:  


Your x-ray showed no evidence of a new fracture. Follow-up with your primary 

doctor in the next 2-3 days for reevaluation. You may do gentle range of motion 

with the left upper extremity as tolerated. Return to the emergency department 

for any worsening or severe symptoms.





Problem Qualifiers








 Primary Impression:  


 Shoulder pain, left


 Chronicity:  unspecified  Qualified Codes:  M25.512 - Pain in left shoulder








JET TAY MD Oct 14, 2017 02:54

## 2017-10-14 NOTE — RAD
Two-view left humerus study



History: Pain after a fall.



Findings: No acute fracture or dislocation or osteolytic process is seen.

There is widening of the AC joint which may be postoperative in nature. This

was seen on a previous study dated September 3, 2017. There is mild primary

degenerative osteoarthritis of the glenohumeral joint.



IMPRESSION: No acute fracture.

## 2019-07-21 ENCOUNTER — HOSPITAL ENCOUNTER (INPATIENT)
Dept: HOSPITAL 35 - ER | Age: 59
LOS: 3 days | Discharge: HOME | DRG: 552 | End: 2019-07-24
Attending: HOSPITALIST | Admitting: HOSPITALIST
Payer: COMMERCIAL

## 2019-07-21 VITALS — WEIGHT: 241 LBS | HEIGHT: 70 IN | BODY MASS INDEX: 34.5 KG/M2

## 2019-07-21 VITALS — SYSTOLIC BLOOD PRESSURE: 96 MMHG | DIASTOLIC BLOOD PRESSURE: 32 MMHG

## 2019-07-21 DIAGNOSIS — E03.9: ICD-10-CM

## 2019-07-21 DIAGNOSIS — G47.33: ICD-10-CM

## 2019-07-21 DIAGNOSIS — Z98.891: ICD-10-CM
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
## 2019-07-21 DIAGNOSIS — M62.830: Primary | ICD-10-CM

## 2019-07-21 DIAGNOSIS — G62.9: ICD-10-CM

## 2019-07-21 DIAGNOSIS — Z88.8: ICD-10-CM

## 2019-07-21 DIAGNOSIS — Z88.6: ICD-10-CM
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
## 2019-07-21 DIAGNOSIS — F32.9: ICD-10-CM
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
## 2019-07-21 PROCEDURE — 10879: CPT

## 2019-07-22 VITALS — SYSTOLIC BLOOD PRESSURE: 144 MMHG | DIASTOLIC BLOOD PRESSURE: 95 MMHG

## 2019-07-22 VITALS — DIASTOLIC BLOOD PRESSURE: 85 MMHG | SYSTOLIC BLOOD PRESSURE: 164 MMHG

## 2019-07-22 VITALS — SYSTOLIC BLOOD PRESSURE: 140 MMHG | DIASTOLIC BLOOD PRESSURE: 70 MMHG

## 2019-07-22 VITALS — SYSTOLIC BLOOD PRESSURE: 136 MMHG | DIASTOLIC BLOOD PRESSURE: 83 MMHG

## 2019-07-22 VITALS — DIASTOLIC BLOOD PRESSURE: 60 MMHG | SYSTOLIC BLOOD PRESSURE: 141 MMHG

## 2019-07-22 VITALS — DIASTOLIC BLOOD PRESSURE: 82 MMHG | SYSTOLIC BLOOD PRESSURE: 137 MMHG

## 2019-07-22 LAB
ANION GAP SERPL CALC-SCNC: 8 MMOL/L (ref 7–16)
BASOPHILS NFR BLD AUTO: 0.3 % (ref 0–2)
BUN SERPL-MCNC: 16 MG/DL (ref 7–18)
CALCIUM SERPL-MCNC: 9.3 MG/DL (ref 8.5–10.1)
CHLORIDE SERPL-SCNC: 103 MMOL/L (ref 98–107)
CO2 SERPL-SCNC: 30 MMOL/L (ref 21–32)
CREAT SERPL-MCNC: 1 MG/DL (ref 0.6–1)
EOSINOPHIL NFR BLD: 1.4 % (ref 0–3)
ERYTHROCYTE [DISTWIDTH] IN BLOOD BY AUTOMATED COUNT: 14.3 % (ref 10.5–14.5)
GLUCOSE SERPL-MCNC: 100 MG/DL (ref 74–106)
GRANULOCYTES NFR BLD MANUAL: 65.4 % (ref 36–66)
HCT VFR BLD CALC: 38.9 % (ref 37–47)
HGB BLD-MCNC: 13 GM/DL (ref 12–15)
LYMPHOCYTES NFR BLD AUTO: 25.6 % (ref 24–44)
MCH RBC QN AUTO: 30.9 PG (ref 26–34)
MCHC RBC AUTO-ENTMCNC: 33.5 G/DL (ref 28–37)
MCV RBC: 92.2 FL (ref 80–100)
MONOCYTES NFR BLD: 7.3 % (ref 1–8)
NEUTROPHILS # BLD: 5.1 THOU/UL (ref 1.4–8.2)
PLATELET # BLD: 193 THOU/UL (ref 150–400)
POTASSIUM SERPL-SCNC: 4.9 MMOL/L (ref 3.5–5.1)
RBC # BLD AUTO: 4.22 MIL/UL (ref 4.2–5)
SODIUM SERPL-SCNC: 141 MMOL/L (ref 136–145)
WBC # BLD AUTO: 7.8 THOU/UL (ref 4–11)

## 2019-07-22 NOTE — NUR
EDUCATION GIVEN TO PT REGARDING TELEMETRY INTERFERENCE MONITORING, CONSENT
SIGNED BY PT AND ALLOWED FOR QUESTIONS.

## 2019-07-22 NOTE — NUR
PT LIVES ALONE AND REPORTS SHE FELL DOWN 6 STEPS LAST ENRICO @ 1900..DENIES
HITTING HEAD..REPORTS URINARY INCONTINENCE X 2...STATES HER PAIN IS 8/10 TO RT
LOWER BACK AND ALSO IN RT LEG...DECREASED USE OF RT LEG RE
NEUROPATHY/FALL..REPORTS  BURNING SENSATION TO RT LEG RE NEUROPATHY..MEDICATED
IN ED AND WAS GIVEN NARCAN THIS LEE @ 0400...WILL HAVE PATIENT ON CONT SAT
MONITOR TO WATCH HER SATS...

## 2019-07-23 VITALS — DIASTOLIC BLOOD PRESSURE: 53 MMHG | SYSTOLIC BLOOD PRESSURE: 99 MMHG

## 2019-07-23 VITALS — DIASTOLIC BLOOD PRESSURE: 64 MMHG | SYSTOLIC BLOOD PRESSURE: 123 MMHG

## 2019-07-23 VITALS — DIASTOLIC BLOOD PRESSURE: 80 MMHG | SYSTOLIC BLOOD PRESSURE: 150 MMHG

## 2019-07-23 VITALS — SYSTOLIC BLOOD PRESSURE: 104 MMHG | DIASTOLIC BLOOD PRESSURE: 61 MMHG

## 2019-07-23 VITALS — SYSTOLIC BLOOD PRESSURE: 109 MMHG | DIASTOLIC BLOOD PRESSURE: 58 MMHG

## 2019-07-23 LAB
ANION GAP SERPL CALC-SCNC: 10 MMOL/L (ref 7–16)
BUN SERPL-MCNC: 15 MG/DL (ref 7–18)
CALCIUM SERPL-MCNC: 8.9 MG/DL (ref 8.5–10.1)
CHLORIDE SERPL-SCNC: 99 MMOL/L (ref 98–107)
CO2 SERPL-SCNC: 26 MMOL/L (ref 21–32)
CREAT SERPL-MCNC: 0.3 MG/DL (ref 0.6–1)
ERYTHROCYTE [DISTWIDTH] IN BLOOD BY AUTOMATED COUNT: 13.9 % (ref 10.5–14.5)
GLUCOSE SERPL-MCNC: 95 MG/DL (ref 74–106)
HCT VFR BLD CALC: 37.3 % (ref 37–47)
HGB BLD-MCNC: 12.6 GM/DL (ref 12–15)
MCH RBC QN AUTO: 31.3 PG (ref 26–34)
MCHC RBC AUTO-ENTMCNC: 33.9 G/DL (ref 28–37)
MCV RBC: 92.4 FL (ref 80–100)
PLATELET # BLD: 195 THOU/UL (ref 150–400)
POTASSIUM SERPL-SCNC: 5.7 MMOL/L (ref 3.5–5.1)
RBC # BLD AUTO: 4.04 MIL/UL (ref 4.2–5)
SODIUM SERPL-SCNC: 135 MMOL/L (ref 136–145)
WBC # BLD AUTO: 6 THOU/UL (ref 4–11)

## 2019-07-23 NOTE — NUR
SHIFT SUMMARY: PT PROGRESSING. NAUSEA RESOLVED, STARTED TAKING JELLO/LIQUIDS,
THEN ADVANCED TO MEAL AND ALL WELL TOLERATED. PO NARCOTIC AND FLEXERIL GIVEN
FOR CHRONIC MID LOWER BACK PAIN. PT REQUESTING CLONAZEPAM, UPDATED THAT DR. ORTIZ IS AWARE AND MED NOT ORDERED AT THIS TIME. PT REQUESTING IV PAIN
MEDICATION, THEN REINFORCED THE IMPORTANCE OF TAKING PO MEDS TO CONTINUE THE
PLAN TO GO HOME IN AM. REQUESTED AMBIEN, THEN STRESSED THE IMPORTANCE OF
HAVING HER PAIN MEDS AT THIS TIME FOR PAIN CONTROL. PT AGREEABLE TO CONTINUE
TAKING PO MEDS FOR PAIN CONTROL. PT GETTING OUT OF BED AT LEAST THREE TIMES
TODAY WITHOUT ASSISTANCE.  RN AND TECH BOTH EXPLAINING IMPORTANCE OF CALL
LIGHT USE WHEN USING BEDSIDE COMMODE. ROOM AIR, SAO2>/= TO 92%, SR- 70-80'S.
CONTINUING TO PROGRESS.

## 2019-07-23 NOTE — NUR
INITIAL ASSESSMENT:
Received consult for home health services. SW reviewed chart and spoke with
attending physician. Pt was admitted from home due to intractable back pain.
Therapy ordered to evaluate pt for discharge needs. SW attempted to meet with
pt at bedside. Pt sleeping soundly. Discharge home is anticipated for
tomorrow. Awaiting therapy evals. SW is following to assist as needed with
discharge planning.

## 2019-07-23 NOTE — NUR
PT IN BED BUT HAS NOT HAD MUCH RELIEF FROM VOMITING THROUGHOUT SHIFT AND SINCE
ADMISSION. PT REMAINS SR ON MONITOR. PT HAS BEEN AFEBRILE. PT ON 2L NC WITH
CONT PULSE OX. AM LABS TO BE REVIEWED.

## 2019-07-24 VITALS — DIASTOLIC BLOOD PRESSURE: 69 MMHG | SYSTOLIC BLOOD PRESSURE: 122 MMHG

## 2019-07-24 VITALS — DIASTOLIC BLOOD PRESSURE: 51 MMHG | SYSTOLIC BLOOD PRESSURE: 102 MMHG

## 2019-07-24 VITALS — DIASTOLIC BLOOD PRESSURE: 64 MMHG | SYSTOLIC BLOOD PRESSURE: 94 MMHG

## 2019-07-24 VITALS — SYSTOLIC BLOOD PRESSURE: 102 MMHG | DIASTOLIC BLOOD PRESSURE: 51 MMHG

## 2019-07-24 LAB
ANION GAP SERPL CALC-SCNC: 10 MMOL/L (ref 7–16)
BUN SERPL-MCNC: 18 MG/DL (ref 7–18)
CALCIUM SERPL-MCNC: 9 MG/DL (ref 8.5–10.1)
CHLORIDE SERPL-SCNC: 99 MMOL/L (ref 98–107)
CO2 SERPL-SCNC: 30 MMOL/L (ref 21–32)
CREAT SERPL-MCNC: 0.9 MG/DL (ref 0.6–1)
ERYTHROCYTE [DISTWIDTH] IN BLOOD BY AUTOMATED COUNT: 14 % (ref 10.5–14.5)
GLUCOSE SERPL-MCNC: 162 MG/DL (ref 74–106)
HCT VFR BLD CALC: 36.4 % (ref 37–47)
HGB BLD-MCNC: 12.3 GM/DL (ref 12–15)
MCH RBC QN AUTO: 30.8 PG (ref 26–34)
MCHC RBC AUTO-ENTMCNC: 33.8 G/DL (ref 28–37)
MCV RBC: 91 FL (ref 80–100)
PLATELET # BLD: 162 THOU/UL (ref 150–400)
POTASSIUM SERPL-SCNC: 3.9 MMOL/L (ref 3.5–5.1)
RBC # BLD AUTO: 3.99 MIL/UL (ref 4.2–5)
SODIUM SERPL-SCNC: 139 MMOL/L (ref 136–145)
WBC # BLD AUTO: 3.9 THOU/UL (ref 4–11)

## 2019-07-24 NOTE — NUR
DISCHARGE NOTE:
SANDY reviewed chart and spoke with nursing and attending physician. Pt is
medically stable for discharge home today. SW met with pt at bedside.
Introduced role of SW. Pt is alert/orientated x 4. Pt reports she lives in a
senior living apt complex ([facility] There is an elevator inside the
complex. Prior to admission, pt was independent with ADLs. No use of DME. Pt
has a cpap machine at home. Pt does not need home O2. Pt's O2 sat was 89% with
activity and 95% at rest. Pt states she does not have transportation available
to get home. Wheelchair van transportation (at cab rate) arranged for
[phone] per [employer]. SW confirmed pt's home address:
[address] [place] [address] [id number]  [place] [address]. Nursing aware of transportation
time. No additional SW needs identified at this time, but is available to
assist should needs arise.

## 2019-07-24 NOTE — NUR
PT RESTING IN BED CURRENTLY. BACK ON 2L NC WHILE SLEEPING. REQUIRED DOSES OF
PAIN MEDS THROUGHOUT SHIFT. SR ON MONITOR. PT AM LABS TO BE REVIEWED. NO MORE
N/V TONIGHT

## 2019-07-24 NOTE — NUR
PATIENT DISCHARGED TO HOME VIA WHEELCHAIR VAN, DISCHARGE INSTRUCTIONS REVIEWED
WITH PATIENT AS WELL AS PRESCRIPTIONS AND THE NEED TO FOLLOW THEM AS DIRECTED.
PATIENT VERBALIZED UNDERSTANDING.

## 2019-12-23 ENCOUNTER — HOSPITAL ENCOUNTER (INPATIENT)
Dept: HOSPITAL 35 - ER | Age: 59
LOS: 2 days | Discharge: LEFT BEFORE DISCHARGE | DRG: 379 | End: 2019-12-25
Attending: INTERNAL MEDICINE | Admitting: INTERNAL MEDICINE
Payer: COMMERCIAL

## 2019-12-23 VITALS — BODY MASS INDEX: 35.2 KG/M2 | WEIGHT: 245.9 LBS | HEIGHT: 70 IN

## 2019-12-23 VITALS — DIASTOLIC BLOOD PRESSURE: 74 MMHG | SYSTOLIC BLOOD PRESSURE: 138 MMHG

## 2019-12-23 DIAGNOSIS — Z90.49: ICD-10-CM

## 2019-12-23 DIAGNOSIS — F41.9: ICD-10-CM

## 2019-12-23 DIAGNOSIS — Z88.8: ICD-10-CM

## 2019-12-23 DIAGNOSIS — G62.9: ICD-10-CM

## 2019-12-23 DIAGNOSIS — M51.26: ICD-10-CM

## 2019-12-23 DIAGNOSIS — Z79.891: ICD-10-CM

## 2019-12-23 DIAGNOSIS — F32.9: ICD-10-CM

## 2019-12-23 DIAGNOSIS — M54.9: ICD-10-CM

## 2019-12-23 DIAGNOSIS — Z90.722: ICD-10-CM

## 2019-12-23 DIAGNOSIS — Z88.5: ICD-10-CM

## 2019-12-23 DIAGNOSIS — G47.33: ICD-10-CM

## 2019-12-23 DIAGNOSIS — M32.9: ICD-10-CM

## 2019-12-23 DIAGNOSIS — Z90.710: ICD-10-CM

## 2019-12-23 DIAGNOSIS — G89.29: ICD-10-CM

## 2019-12-23 DIAGNOSIS — Z79.899: ICD-10-CM

## 2019-12-23 DIAGNOSIS — M19.90: ICD-10-CM

## 2019-12-23 DIAGNOSIS — E03.9: ICD-10-CM

## 2019-12-23 DIAGNOSIS — R10.9: ICD-10-CM

## 2019-12-23 DIAGNOSIS — K92.0: Primary | ICD-10-CM

## 2019-12-23 DIAGNOSIS — Z87.01: ICD-10-CM

## 2019-12-23 DIAGNOSIS — Z90.89: ICD-10-CM

## 2019-12-23 LAB
ALBUMIN SERPL-MCNC: 3.7 G/DL (ref 3.4–5)
ALT SERPL-CCNC: 20 U/L (ref 30–65)
ANION GAP SERPL CALC-SCNC: 7 MMOL/L (ref 7–16)
AST SERPL-CCNC: 12 U/L (ref 15–37)
BASOPHILS NFR BLD AUTO: 0.6 % (ref 0–2)
BILIRUB SERPL-MCNC: 0.1 MG/DL
BUN SERPL-MCNC: 23 MG/DL (ref 7–18)
CALCIUM SERPL-MCNC: 9.2 MG/DL (ref 8.5–10.1)
CHLORIDE SERPL-SCNC: 105 MMOL/L (ref 98–107)
CO2 SERPL-SCNC: 27 MMOL/L (ref 21–32)
CREAT SERPL-MCNC: 0.8 MG/DL (ref 0.6–1)
EOSINOPHIL NFR BLD: 1.5 % (ref 0–3)
ERYTHROCYTE [DISTWIDTH] IN BLOOD BY AUTOMATED COUNT: 13.7 % (ref 10.5–14.5)
GLUCOSE SERPL-MCNC: 111 MG/DL (ref 74–106)
GRANULOCYTES NFR BLD MANUAL: 47.9 % (ref 36–66)
HCT VFR BLD CALC: 36.7 % (ref 37–47)
HGB BLD-MCNC: 12.3 GM/DL (ref 12–15)
LIPASE: 138 U/L (ref 73–393)
LYMPHOCYTES NFR BLD AUTO: 39.5 % (ref 24–44)
MCH RBC QN AUTO: 32 PG (ref 26–34)
MCHC RBC AUTO-ENTMCNC: 33.4 G/DL (ref 28–37)
MCV RBC: 95.7 FL (ref 80–100)
MONOCYTES NFR BLD: 10.5 % (ref 1–8)
NEUTROPHILS # BLD: 2.3 THOU/UL (ref 1.4–8.2)
PLATELET # BLD: 170 THOU/UL (ref 150–400)
POTASSIUM SERPL-SCNC: 4.4 MMOL/L (ref 3.5–5.1)
PROT SERPL-MCNC: 7.1 G/DL (ref 6.4–8.2)
RBC # BLD AUTO: 3.83 MIL/UL (ref 4.2–5)
SODIUM SERPL-SCNC: 139 MMOL/L (ref 136–145)
WBC # BLD AUTO: 4.9 THOU/UL (ref 4–11)

## 2019-12-23 PROCEDURE — 27000 TENOTOMY ADDUCTOR HIP PERQ: CPT

## 2019-12-23 PROCEDURE — 10879: CPT

## 2019-12-24 VITALS — DIASTOLIC BLOOD PRESSURE: 71 MMHG | SYSTOLIC BLOOD PRESSURE: 121 MMHG

## 2019-12-24 VITALS — DIASTOLIC BLOOD PRESSURE: 83 MMHG | SYSTOLIC BLOOD PRESSURE: 111 MMHG

## 2019-12-24 VITALS — DIASTOLIC BLOOD PRESSURE: 81 MMHG | SYSTOLIC BLOOD PRESSURE: 110 MMHG

## 2019-12-24 VITALS — SYSTOLIC BLOOD PRESSURE: 141 MMHG | DIASTOLIC BLOOD PRESSURE: 92 MMHG

## 2019-12-24 VITALS — DIASTOLIC BLOOD PRESSURE: 100 MMHG | SYSTOLIC BLOOD PRESSURE: 141 MMHG

## 2019-12-24 VITALS — SYSTOLIC BLOOD PRESSURE: 137 MMHG | DIASTOLIC BLOOD PRESSURE: 44 MMHG

## 2019-12-24 VITALS — DIASTOLIC BLOOD PRESSURE: 87 MMHG | SYSTOLIC BLOOD PRESSURE: 155 MMHG

## 2019-12-24 LAB
ANION GAP SERPL CALC-SCNC: 9 MMOL/L (ref 7–16)
BENZODIAZ UR-MCNC: POSITIVE UG/L
BUN SERPL-MCNC: 17 MG/DL (ref 7–18)
CALCIUM SERPL-MCNC: 8.7 MG/DL (ref 8.5–10.1)
CHLORIDE SERPL-SCNC: 103 MMOL/L (ref 98–107)
CO2 SERPL-SCNC: 25 MMOL/L (ref 21–32)
CREAT SERPL-MCNC: 0.7 MG/DL (ref 0.6–1)
ERYTHROCYTE [DISTWIDTH] IN BLOOD BY AUTOMATED COUNT: 14.1 % (ref 10.5–14.5)
GLUCOSE SERPL-MCNC: 109 MG/DL (ref 74–106)
HCT VFR BLD CALC: 40.4 % (ref 37–47)
HGB BLD-MCNC: 13.4 GM/DL (ref 12–15)
MCH RBC QN AUTO: 32.1 PG (ref 26–34)
MCHC RBC AUTO-ENTMCNC: 33.1 G/DL (ref 28–37)
MCV RBC: 97 FL (ref 80–100)
OPIATES UR-MCNC: POSITIVE NG/ML
PLATELET # BLD: 154 THOU/UL (ref 150–400)
POTASSIUM SERPL-SCNC: 4.8 MMOL/L (ref 3.5–5.1)
RBC # BLD AUTO: 4.16 MIL/UL (ref 4.2–5)
SODIUM SERPL-SCNC: 137 MMOL/L (ref 136–145)
WBC # BLD AUTO: 8.3 THOU/UL (ref 4–11)

## 2019-12-24 NOTE — NUR
INITIAL ASSESSMENT:
Received consult. SANDY reviewed chart and spoke with nursing and attending
physician. Pt was admitted from home due abdominal pain/nausea/vomiting. GI
consulted. Pt to have EGD on Thursday. SANDY met with pt at bedside. Introduced
role of SANDY. Pt is alert/orientated and states she lives alone in an apt at
[facility]. Pt lives on the 4th floor and has elevator access to the 4th
floor. Pt reports she has a cane and walker at home. Pt also has a cpap. Pt
has used HH in the past, but is unsure of name of provider. Pt does not have a
PCP and would like a [facility] provider list at time of discharge. Pt denies having
any additional needs.  SANDY is following to assist as needed with discharge
planning.

## 2019-12-24 NOTE — NUR
Patient arrived to  around 0345. She was oriented to her room, admission
documentation and assessments completed, and orders initiated.
 
Patient is anxious and
impulsive. She was directed to call if she needs assistance getting up. Fall
precautions in place. Nursing will continue to monitor.

## 2019-12-24 NOTE — NUR
VASCULAR ACCESS CONSULTED FOR MIDLINE, VESSELS 2CM DEEP. DISCUSSED ML WITH
PT,BENEFITS AND RISKS,VERBALIZED UNDERSTANDING. CLEOPATRA BRACHIAL WIDELY PATENT
WITH USG. 4FR ML TRIMMED TO 12CM INSERTED TO 0CM WITH BRISK BR. ML RELEASED
FOR IMMEDIATE USE PER PROTOCOL TO CORDELIA IVEY

## 2019-12-24 NOTE — NUR
LILA FROM LAB CALLED TO NOTIFY THIS RN THAT PATIENT IS O+ AND HAS SPECIAL
ANTIBODIES (MARTINEZ A) - IF TRANSFUSION NECESSARY, WILL NEED TO SEND OUT FOR
UNITS TO TRANSFUSE. 
LILA ALSO CALLED TO SAY THERE IS A DELAY IN OBTAINING READ OUT OF CBC AND
DIFFERENTIAL (HGB: 12.2, HCT: 36.9M RBC: 3.84) - [facility]

## 2019-12-24 NOTE — NUR
PT RESTING IN BED UPON ARRIVAL TO SHIFT. PT REPORTED NAUSEA AND PRN PROVIDED
PT HAS HAD EMESIS X GREEN IN COLOR. PT TAKING SIPS OF CLEARS.
STEADY GAIT WITH ASSISTANCE OF IV POLE, ALARM ON PT IMPULSIVE GETS OUT OF BED
WITHOUT TAKING SCDS OFF OR TAKING IV POLE. R MIDLINE AND IVF INTACT.

## 2019-12-24 NOTE — NUR
PATIENT CONTINUOSLY CALLING OUT FOR PAIN MEDICATION AND NAUSEA MEDICATIONS. 
PHYSICIAN AWARE. SEE EMAR FOR MEDICATION ADMINISTRATION

## 2019-12-25 VITALS — DIASTOLIC BLOOD PRESSURE: 64 MMHG | SYSTOLIC BLOOD PRESSURE: 128 MMHG

## 2019-12-25 VITALS — SYSTOLIC BLOOD PRESSURE: 147 MMHG | DIASTOLIC BLOOD PRESSURE: 74 MMHG

## 2019-12-25 VITALS — DIASTOLIC BLOOD PRESSURE: 88 MMHG | SYSTOLIC BLOOD PRESSURE: 137 MMHG

## 2019-12-25 NOTE — NUR
Assumed patient care at 0715. Patient's vital signs have been stable. She
spent most of this shift yelling out at staff, coming up to the nurse's
station with several demands. Patient was claiming to be "vomiting all over
the place!" When this nurse would check, all that was noted was approximately
10-20mL's of clear liquid with spit in it. Patient wanted pain medication,
nausea medication before it was due. This medication was given per IV even
though client claimed that her midline was no longer working. Patient called
on her call light several times, yelled out from her room and came to the
nurse's desk several times. She became belligerent and was harassing staff. At
one point, she walked by several patient's rooms stating "you better leave!",
and, "this is a bad place!" Patient later began to yell down the hallway that
her midline was infiltrated and that her arm was going to fall off because of
her IV. IV was removed per patient's request.

## 2019-12-25 NOTE — NUR
PT TOLD THE AIDE SHE DID NOT WANT PAIN MEDS, SHE TOLD THE NURSE SHE DID. SHE
TOLD THE NURSE SHE WAS NOT FEELING THE PAIN MEDICINE AND THINKS HER IV DOES
NOT WORK. TOLD NURSE SHE WAS PUSHING IT TO SLOW IT WAS NOT RELIEVING HER PAIN.
NURSE EDUCATED PT ON MIDLINE VS PERIPHERAL AND RATIONALES BEHIND NOT PUSHING
PAIN MEDS FAST R/T RR AND BP.

## 2020-06-19 ENCOUNTER — HOSPITAL ENCOUNTER (EMERGENCY)
Dept: HOSPITAL 35 - ER | Age: 60
Discharge: HOME | End: 2020-06-19
Payer: COMMERCIAL

## 2020-06-19 VITALS — HEIGHT: 70 IN | BODY MASS INDEX: 34.79 KG/M2 | WEIGHT: 242.99 LBS

## 2020-06-19 VITALS — SYSTOLIC BLOOD PRESSURE: 168 MMHG | DIASTOLIC BLOOD PRESSURE: 85 MMHG

## 2020-06-19 DIAGNOSIS — Z79.899: ICD-10-CM

## 2020-06-19 DIAGNOSIS — Z88.8: ICD-10-CM

## 2020-06-19 DIAGNOSIS — F17.210: ICD-10-CM

## 2020-06-19 DIAGNOSIS — Z90.49: ICD-10-CM

## 2020-06-19 DIAGNOSIS — R10.84: Primary | ICD-10-CM

## 2020-06-19 DIAGNOSIS — R19.7: ICD-10-CM

## 2020-06-19 DIAGNOSIS — R11.10: ICD-10-CM

## 2020-06-19 DIAGNOSIS — E03.9: ICD-10-CM

## 2020-06-19 LAB
ALBUMIN SERPL-MCNC: 3.3 G/DL (ref 3.4–5)
ALT SERPL-CCNC: 17 U/L (ref 30–65)
ANION GAP SERPL CALC-SCNC: 3 MMOL/L (ref 7–16)
APTT BLD: 25.1 SECONDS (ref 24.5–32.8)
AST SERPL-CCNC: 11 U/L (ref 15–37)
BASOPHILS NFR BLD AUTO: 1 % (ref 0–2)
BILIRUB SERPL-MCNC: 0.2 MG/DL (ref 0.2–1)
BILIRUB UR-MCNC: NEGATIVE MG/DL
BUN SERPL-MCNC: 19 MG/DL (ref 7–18)
CALCIUM SERPL-MCNC: 8.7 MG/DL (ref 8.5–10.1)
CHLORIDE SERPL-SCNC: 106 MMOL/L (ref 98–107)
CO2 SERPL-SCNC: 32 MMOL/L (ref 21–32)
COLOR UR: YELLOW
CREAT SERPL-MCNC: 0.7 MG/DL (ref 0.6–1)
EOSINOPHIL NFR BLD: 1 % (ref 0–3)
ERYTHROCYTE [DISTWIDTH] IN BLOOD BY AUTOMATED COUNT: 13.8 % (ref 10.5–14.5)
GLUCOSE SERPL-MCNC: 76 MG/DL (ref 74–106)
GRANULOCYTES NFR BLD MANUAL: 45 % (ref 36–66)
HCT VFR BLD CALC: 37.4 % (ref 37–47)
HGB BLD-MCNC: 12.8 GM/DL (ref 12–15)
INR PPP: 1
KETONES UR STRIP-MCNC: NEGATIVE MG/DL
LIPASE: 83 U/L (ref 73–393)
LYMPHOCYTES NFR BLD AUTO: 45 % (ref 24–44)
MCH RBC QN AUTO: 32.6 PG (ref 26–34)
MCHC RBC AUTO-ENTMCNC: 34.2 G/DL (ref 28–37)
MCV RBC: 95.4 FL (ref 80–100)
MONOCYTES NFR BLD: 8 % (ref 1–8)
NEUTROPHILS # BLD: 1.8 THOU/UL (ref 1.4–8.2)
OPIATES UR-MCNC: POSITIVE NG/ML
PLATELET # BLD EST: NORMAL 10*3/UL
PLATELET # BLD: 169 THOU/UL (ref 150–400)
POTASSIUM SERPL-SCNC: 4.3 MMOL/L (ref 3.5–5.1)
PROT SERPL-MCNC: 6 G/DL (ref 6.4–8.2)
PROTHROMBIN TIME: 9.9 SECONDS (ref 9.3–11.4)
RBC # BLD AUTO: 3.93 MIL/UL (ref 4.2–5)
RBC # UR STRIP: NEGATIVE /UL
RBC MORPH BLD: NORMAL
SODIUM SERPL-SCNC: 141 MMOL/L (ref 136–145)
SP GR UR STRIP: 1.01 (ref 1–1.03)
TROPONIN I SERPL-MCNC: <0.06 NG/ML (ref ?–0.06)
URINE CLARITY: CLEAR
URINE GLUCOSE-RANDOM*: NEGATIVE
URINE LEUKOCYTES-REFLEX: NEGATIVE
URINE NITRITE-REFLEX: NEGATIVE
URINE PROTEIN (DIPSTICK): NEGATIVE
UROBILINOGEN UR STRIP-ACNC: 0.2 E.U./DL (ref 0.2–1)
WBC # BLD AUTO: 4.1 THOU/UL (ref 4–11)

## 2020-06-19 NOTE — EMS
[facility]
[address] [place]   [address]                     EMS Patient Care Report       
_______________________________________________________________________________
 
Name:       ALBERT DOYLE             Room #:                     REG JACOB TOSCANO#:      [id number]                       Account #:      [id number]  
Admission:  20    Attend Phys:                          
Discharge:              Date of Birth:  [birth year]  
                                                          Report #: [id number]
                                                                    [id number]
_______________________________________________________________________________
THIS REPORT FOR:   //name//                          
 
Report Transmitted: 2020 20:10
EMS Care Summary
[facility] MED-ACT
Incident [id number] @ 2020 16:19
 
Incident Location
[address] [place] [address]
 
Patient
ALBERT DOYLE
Female, 60 Years
 1960
 
Patient Address
[address] [place] [address]
 
Patient History
Asthma,Lupus,Depression,
 
Patient Allergies
No known allergies,
 
Patient Medications
Unknown,
 
Chief Complaint
Abdominal pain
 
Disposition
Transported No Lights/[place]
 
Dispatch Reason
Sick Person
 
Transported To
[facility]
 
Narrative
1142 responded to a residence to find a 60 yr old female sitting in her living 
room. Pt states that she has been having abdominal pain for the past day and 
 
 
 
[facility]
[address] [place]   [address]                     EMS Patient Care Report       
_______________________________________________________________________________
 
Name:       ALBERT DOYLE             [facility] #:                     [facility]      [id number]                       Account #:      [id number]  
Admission:  20    Attend Phys:                          
Discharge:              Date of Birth:  [birth year]  
                                                          Report #: [id number]
                                                                    [id number]
_______________________________________________________________________________
was having nausea and vomiting with diarrhea. Pt states that she would like to 
be evaluated at the ER. Pt denies any chest pain, denies any SOA, denies any 
change in medication, and denies any trauma. Pt was able to ambulate to the 
cot. Pt was seat belted in. Pt was then monitored and transported. Upon arrival 
to the ER, Pt care was transferred to the nursing staff. No change in Pt status 
were noted. 
 
Initial Vitals
@16:49P: 79,R: 16,BP: 96/70,Pain: 4/10,GCS: 15,SpO2: 93,Revised Trauma: 12,MI 
Suspected: false 
@16:50P: 82,R: 18,BP: 114/69,Pain: 8/10,GCS: 15,Revised Trauma: 12,MI 
Suspected: false 
@17:08P: 79,R: 18,BP: 116/69,Pain: 8/10,GCS: 15,SpO2: 96,Revised Trauma: 12,MI 
Suspected: false 
@16:50P: 81,R: 18,Pain: 8/10,GCS: 15,SpO2: 94,MI Suspected: false
 
Assessments
@16:51MENTAL:Person Oriented,Time Oriented,Place Oriented,Event 
Oriented,SKIN:HEENT:Head/Face: No Abnormalities,Neck/Airway: No 
Abnormalities,LUNG SOUNDS:Left Upper: Tenderness,Right Upper: 
Tenderness,ABDOMEN:Left Upper: Tenderness,Right Upper: 
Tenderness,PELVIS//GI:EXTREMITIES:Left Arm: No Abnormalities,Right Arm: No 
Abnormalities,Left Leg: No Abnormalities,Right Leg: No 
Abnormalities,PULSE:NEURO:No Abnormalities, 
 
Impression
Vomiting
 
Procedures
@16:54Ondansetron - 4 Milligrams (mg) - Intravenous (IV)Response: 
Unchanged@16:5012-Lead ECG 
 
Timeline
16:17,Call Received
16:17,Psap Call
16:19,Dispatched
16:20,En Route
16:27,On Scene
16:29,At Patient
16:49,BP: 96/70 M,PULSE: 79,RR: 16 R,SPO2: 93 Ox,ETCO2:  ,BG: ,PAIN: 4,GCS: 15,
16:50,12-Lead ECG,
16:50,BP: / M,PULSE: 81,RR: 18 R,SPO2: 94 Ox,ETCO2:  ,BG: ,PAIN: 8,GCS: 15,
16:50,BP: 114/69 M,PULSE: 82,RR: 18 R,SPO2:  Ox,ETCO2:  ,BG: ,PAIN: 8,GCS: 15,
16:52,Depart Scene
16:54,Ondansetron - 4 Milligrams (mg) - Intravenous (IV),Response: Unchanged
17:08,BP: 116/69 M,PULSE: 79,RR: 18 R,SPO2: 96 Ox,ETCO2:  ,BG: ,PAIN: 8,GCS: 15,
 
 
 
[address]   [address]                     EMS Patient Care Report       
_______________________________________________________________________________
 
Name:       ALBERT DOLYE             Room #:                     REG JACOB TOSCANO#:      [id number]                       Account #:      [id number]  
Admission:  20    Attend Phys:                          
Discharge:              Date of Birth:  [birth year]  
                                                          Report #: [id number]
                                                                    [id number]
_______________________________________________________________________________
17:11,At Destination
17:13,Transfer Patient
17:45,Call Closed
 
Disclaimer
v1.1     Copyright 2020 [employer]
This EMS Care Summary contains data elements from the applicable legal record 
(which may be displayed differently). It is designed to provide pertinent 
information for the following purposes: continuity of care, clinical quality, 
and state data reporting. The complete legal record is available to ED staff 
and administrators of the receiving hospital in [facility] Patient Tracker. All data 
is provided "as is."

## 2020-06-20 NOTE — EKG
[facility]
[address]
[place]   [address]                     ELECTROCARDIOGRAM REPORT      
_______________________________________________________________________________
 
Name:       VIVEKALBERT             Room #:                     [facility]#:      [id number]                       Account #:      [id number]  
Admission:  20    Attend Phys:                          
Discharge:  20    Date of Birth:  [birth year]  
                                                          Report #: [id number]
                                                                    [id number]
_______________________________________________________________________________
THIS REPORT FOR:  
 
cc:  [facility] - Shiela family physician/PCP 
     [facility] - Shiela family physician/PCP 
     Benito Patiño MD                                            ~
THIS REPORT FOR:   //name//                          
 
                         [facility] ED
                                       
Test Date:    2020               Test Time:    17:32:01
Pat Name:     ALBERT DOYLE          Department:   
Patient ID:   [id number]            Room:          
Gender:       F                        Technician:   
:          1960               Requested By: Ozzy Genao
Order Number: [id number] MD:   Benito Patiño
                                 Measurements
Intervals                              Axis          
Rate:         76                       P:            62
WA:           166                      QRS:          50
QRSD:         97                       T:            53
QT:           392                                    
QTc:          441                                    
                           Interpretive Statements
Sinus rhythm
Compared to ECG 10/22/2012 21:56:15
No significant changes
 
Electronically Signed On 2020 11:50:27 CDT by Benito Patiño
[email or web address]
 
 
 
 
 
 
 
 
 
 
 
 
 
 
 
  <ELECTRONICALLY SIGNED>
   By: Benito Patiño MD        
  20     1150
D: 20 173                           _____________________________________
T: 20 173                           Benito Patiño MD          /WU

## 2021-07-22 NOTE — NUR
PT VOICING ABDOMINAL PAIN AND ANXIETY THROUGH OUT THE DAY. PRN DILUADID AND
ATIVAN GIVEN THROUGH OUT THE DAY. UPDATED DR. PAVON ON PATIENTS CONTINOUAL
REUQEST FOR NARCOTICS. SEEN BY GI. PLAN FOR EGD ON THURSDAY. OKAY TO START ON
CLEAR LIQUID DIET AND ADVANCED IF TOLERATED. PT IMPULSIVE AND FORGETS TO USE
CALL LIGHT. BED ALARM ON. FALL PRECAUTIONS IN PLACE. [FreeTextEntry1] : Pt returns today for afollow up visit. \par Expalins headaches have changed over the past several months - has more suboccipital and neck pain pain. \par Headache is everyday . \par Currently not taking meds _ has "tried a lot ".\par Pt is  requesting an MRI , does not wish to take meds.  [Headache] : headache [Chronic Headache] : chronic headache [Dizziness] : no dizziness [Nausea] : no nausea [Vomiting] : no Vomiting [Photophobia] : no photophobia [Phonophobia] : no phonophobia [Neck Pain] : neck pain [Daily] : daily

## 2023-11-10 NOTE — NUR
DISCHARGE ORDERS RECEIVED.  PATIENT DISCHARGING TO HOME. PATIENT IN NEED OF
TRANSPORTATION TO HOME.  [facility] [employer] TO TRANSPORT PATIENT, CAB RATE.
[id number] HOURS  TIME.  UNIT NOTIFIED.  SW NOTIFIED. Abilify 5 mg daily   atoMOXetine. 80 milliGRAM(s) Oral once